# Patient Record
Sex: FEMALE | Race: OTHER | Employment: FULL TIME | URBAN - METROPOLITAN AREA
[De-identification: names, ages, dates, MRNs, and addresses within clinical notes are randomized per-mention and may not be internally consistent; named-entity substitution may affect disease eponyms.]

---

## 2017-01-10 ENCOUNTER — OFFICE VISIT (OUTPATIENT)
Dept: OBGYN CLINIC | Age: 32
End: 2017-01-10

## 2017-01-10 VITALS
WEIGHT: 175 LBS | SYSTOLIC BLOOD PRESSURE: 124 MMHG | HEART RATE: 90 BPM | DIASTOLIC BLOOD PRESSURE: 70 MMHG | BODY MASS INDEX: 31.01 KG/M2 | HEIGHT: 63 IN

## 2017-01-10 DIAGNOSIS — O36.80X0 PREGNANCY OF UNKNOWN ANATOMIC LOCATION: Primary | ICD-10-CM

## 2017-01-10 DIAGNOSIS — O21.9 NAUSEA/VOMITING IN PREGNANCY: ICD-10-CM

## 2017-01-10 RX ORDER — PYRIDOXINE HCL (VITAMIN B6) 25 MG
25 TABLET ORAL
Qty: 30 TAB | Refills: 2 | Status: SHIPPED | OUTPATIENT
Start: 2017-01-10 | End: 2017-07-20

## 2017-01-10 RX ORDER — ACETAMINOPHEN 325 MG/1
TABLET ORAL
COMMUNITY
End: 2017-07-20

## 2017-01-10 NOTE — PATIENT INSTRUCTIONS
Weeks 6 to 10 of Your Pregnancy: Care Instructions  Your Care Instructions    Congratulations on your pregnancy. This is an exciting and important time for you. You are 7w4d today and due 8/25/2017. During the first 6 to 10 weeks of your pregnancy, your body goes through many changes. Your baby grows very fast, even though you cannot feel it yet. You may start to notice that you feel different, both in your body and your emotions. Because each woman's pregnancy is unique, there is no right way to feel. You may feel the healthiest you have ever been, or you may feel tired or sick to your stomach (\"morning sickness\"). These early weeks are a time to make healthy choices and to eat the best foods for you and your baby. This care sheet will give you some ideas. This is also a good time to think about birth defects testing. These are tests done during pregnancy to look for possible problems with the baby. First trimester tests for birth defects can be done between 8 and 17 weeks of pregnancy, depending on the test. Talk with your doctor about what kinds of tests are available. Follow-up care is a key part of your treatment and safety. Be sure to make and go to all appointments, and call your doctor if you are having problems. It's also a good idea to know your test results and keep a list of the medicines you take. How can you care for yourself at home? Eat well  · Eat at least 3 meals and 2 healthy snacks every day. Eat fresh, whole foods, including:  ¨ 7 or more servings of bread, tortillas, cereal, rice, pasta, or oatmeal.  ¨ 3 or more servings of vegetables, especially leafy green vegetables. ¨ 2 or more servings of fruits. ¨ 3 or more servings of milk, yogurt, or cheese. ¨ 2 or more servings of meat, turkey, chicken, fish, eggs, or dried beans. · Drink plenty of fluids, especially water. Avoid sodas and other sweetened drinks.   · Choose foods that have important vitamins for your baby, such as calcium, iron, and folate. ¨ Dairy products, tofu, canned fish with bones, almonds, broccoli, dark leafy greens, corn tortillas, and fortified orange juice are good sources of calcium. ¨ Beef, poultry, liver, spinach, lentils, dried beans, fortified cereals, and dried fruits are rich in iron. ¨ Dark leafy greens, broccoli, asparagus, liver, fortified cereals, orange juice, peanuts, and almonds are good sources of folate. · Avoid foods that could harm your baby. ¨ Do not eat raw or undercooked meat, chicken, or fish (such as sushi or raw oysters). ¨ Do not eat raw eggs or foods that contain raw eggs, such as Caesar dressing. ¨ Do not eat soft cheeses and unpasteurized dairy foods, such as Brie, feta, or blue cheese. ¨ Do not eat fish that contains a lot of mercury, such as shark, swordfish, tilefish, or ade mackerel. Do not eat more than 6 ounces of tuna each week. ¨ Do not eat raw sprouts, especially alfalfa sprouts. ¨ Cut down on caffeine, such as coffee, tea, and cola. Protect yourself and your baby  · Do not touch matt litter or cat feces. They can cause an infection that could harm your baby. · High body temperature can be harmful to your baby. So if you want to use a sauna or hot tub, be sure to talk to your doctor about how to use it safely. [place] with morning sickness  · Sip small amounts of water, juices, or shakes. Try drinking between meals, not with meals. · Eat 5 or 6 small meals a day. Try dry toast or crackers when you first get up, and eat breakfast a little later. · Avoid spicy, greasy, and fatty foods. · When you feel sick, open your windows or go for a short walk to get fresh air. · Try nausea wristbands. These help some women. · Tell your doctor if you think your prenatal vitamins make you sick. Where can you learn more? Go to http://mo-carlota.info/.   Enter G112 in the search box to learn more about \"Weeks 6 to 10 of Your Pregnancy: Care Instructions. \"  Current as of: May 30, 2016  Content Version: 11.1  © 5566-7280 [employer]. Care instructions adapted under license by Rain (which disclaims liability or warranty for this information). If you have questions about a medical condition or this instruction, always ask your healthcare professional. [address] any warranty or liability for your use of this information. Managing Morning Sickness: Care Instructions  Your Care Instructions  For many women, the toughest part of early pregnancy is morning sickness. Morning sickness can range from mild nausea to severe nausea with bouts of vomiting. Symptoms may be worse in the morning, although they can strike at any time of the day or night. If you have nausea, vomiting, or both, look for safe measures that can bring you relief. You can take simple steps at home to manage morning sickness. These steps include changing what and when you eat and avoiding certain foods and smells. Some women find that acupuncture and acupressure wristbands also help. Follow-up care is a key part of your treatment and safety. Be sure to make and go to all appointments, and call your doctor if you are having problems. It's also a good idea to know your test results and keep a list of the medicines you take. How can you care for yourself at home? · Keep food in your stomach, but not too much at once. Your nausea may be worse if your stomach is empty. Eat five or six small meals a day instead of three large meals. · For morning nausea, eat a small snack, such as a couple of crackers or dry biscuits, before rising. Allow a few minutes for your stomach to settle before you get out of bed slowly. · Drink plenty of fluids, enough so that your urine is light yellow or clear like water.  If you have kidney, heart, or liver disease and have to limit fluids, talk with your doctor before you increase the amount of fluids you drink. Some women find that peppermint tea helps with nausea. · Eat more protein, such as chicken, fish, lean meat, beans, nuts, and seeds. · Eat carbohydrate foods, such as potatoes, whole-grain cereals, rice, and pasta. · Avoid smells and foods that make you feel nauseated. Spicy or high-fat foods, citrus juice, milk, coffee, and tea with caffeine often make nausea worse. · Do not drink alcohol. · Do not smoke. Try not to be around others who smoke. If you need help quitting, talk to your doctor about stop-smoking programs and medicines. These can increase your chances of quitting for good. · If you are taking iron supplements, ask your doctor if they are necessary. Iron can make nausea worse. · Get lots of rest. Stress and fatigue can make your morning sickness worse. · Ask your doctor about taking prescription medicine, or over-the-counter products such as vitamin B6, doxylamine, or caridad, to relieve your symptoms. Your doctor can tell you the doses that are safe for you. · Take your prenatal vitamins at night on a full stomach. When should you call for help? Call your doctor now or seek immediate medical care if:  · You are too sick to your stomach to drink any fluids. · You have symptoms of dehydration, such as:  ¨ Dry eyes and a dry mouth. ¨ Passing only a little dark urine. ¨ Feeling thirstier than usual.  · You have new symptoms such as diarrhea, fever, or belly pain. Watch closely for changes in your health, and be sure to contact your doctor if:  · You lose weight. · You have ongoing nausea and vomiting. Where can you learn more? Go to http://mo-carlota.info/. Enter U506 in the search box to learn more about \"Managing Morning Sickness: Care Instructions. \"  Current as of: June 8, 2016  Content Version: 11.1  © 3430-7462 [employer].  Care instructions adapted under license by [employer] (which disclaims liability or warranty for this information). If you have questions about a medical condition or this instruction, always ask your healthcare professional. [place] [address] any warranty or liability for your use of this information.

## 2017-01-10 NOTE — PROGRESS NOTES
28 y.o. with amenorrhea and positive UPT. Reports nausea and vomiting. Somewhat improved with caridad tea. Review of Systems:   General ROS: negative for fever  Endocrine ROS: negative for -  breast mass/nipple discharge/galactorrhea, hair pattern changes, hot flashes, skin changes or temperature intolerance. Breast ROS: negative for - breast tenderness  Gastrointestinal ROS: no abdominal pain, change in bowel habits, or black or bloody stools  Genito-Urinary ROS: no dysuria, trouble voiding, incontinence or hematuria. No pelvic pain, vaginal bleeding/unusual discharge. Musculoskeletal ROS: negative    Regular menses, no hx STI/abn pap. Pap smear due per patient. OB History      Para Term  AB TAB SAB Ectopic Multiple Living    3 2 2               History reviewed. No pertinent past medical history. History reviewed. No pertinent past surgical history. Social History     Social History    Marital status: SINGLE     Spouse name: N/A    Number of children: N/A    Years of education: N/A     Occupational History    Not on file. Social History Main Topics    Smoking status: Never Smoker    Smokeless tobacco: Never Used    Alcohol use Yes      Comment: none since pregnancy    Drug use: No    Sexual activity: Yes     Partners: Male     Other Topics Concern    Not on file     Social History Narrative    No narrative on file     No Known Allergies  No current outpatient prescriptions on file prior to visit. No current facility-administered medications on file prior to visit. Visit Vitals    /70    Pulse 90    Ht 5' 3\" (1.6 m)    Wt 175 lb (79.4 kg)    LMP 2016 (Exact Date)    BMI 31 kg/m2     Gen:   NAD, WN, WH. Abd:  ND, soft, NT.  US done, see report. A/P:    ICD-10-CM ICD-9-CM    1.  Pregnancy of unknown anatomic location Z33.1 V22.2 AMB POC US OB < 14 WKS, 1ST GESTATION      pyridoxine, vitamin B6, (VITAMIN B-6) 25 mg tablet      doxylamine succinate (UNISOM, DOXYLAMINE,) 25 mg tablet   2. Nausea/vomiting in pregnancy O21.9 643.90 pyridoxine, vitamin B6, (VITAMIN B-6) 25 mg tablet      doxylamine succinate (UNISOM, DOXYLAMINE,) 25 mg tablet     Briefly reviewed pregnancy care. N/v in pregnancy:  Encouraged adequate hydration, frequent small snacks, caridad. Call for antiemetics needed. Follow up 4 wks for OB intake. Plan of care discussed. Patient expressed understanding.

## 2017-01-10 NOTE — MR AVS SNAPSHOT
Visit Information Date & Time Provider Department Dept. Phone Encounter #  
 1/10/2017 10:00 AM Carolyn Forbes DO [facility] [phone] [id number] Follow-up Instructions Return in about 4 weeks (around 2/7/2017). Upcoming Health Maintenance Date Due  
 PAP AKA CERVICAL CYTOLOGY 7/12/2007 INFLUENZA AGE 9 TO ADULT 8/1/2016 Allergies as of 1/10/2017  Review Complete On: 1/10/2017 By: Carolyn Forbes DO No Known Allergies Current Immunizations  Never Reviewed No immunizations on file. Not reviewed this visit You Were Diagnosed With   
  
 Codes Comments Pregnancy of unknown anatomic location    -  Primary ICD-10-CM: Z33.1 ICD-9-CM: V22.2 Nausea/vomiting in pregnancy     ICD-10-CM: O21.9 ICD-9-CM: 643.90 Vitals BP Pulse Height(growth percentile) Weight(growth percentile) LMP BMI  
 124/70 90 5' 3\" (1.6 m) 175 lb (79.4 kg) 11/18/2016 (Exact Date) 31 kg/m2 OB Status Smoking Status Pregnant Never Smoker BMI and BSA Data Body Mass Index Body Surface Area  
 31 kg/m 2 1.88 m 2 Preferred Pharmacy Pharmacy Name Phone [id number] [phone] [phone] Your Updated Medication List  
  
   
This list is accurate as of: 1/10/17 11:05 AM.  Always use your most recent med list.  
  
  
  
  
 doxylamine succinate 25 mg tablet Commonly known as:  UNISOM (DOXYLAMINE) Take 1 Tab by mouth nightly as needed for Sleep. Indications: NAUSEA GRAVIDARUM  
  
 pnv w/o calcium-iron fum-fa 27-1 mg Tab Take  by mouth.  
  
 pyridoxine (vitamin B6) 25 mg tablet Commonly known as:  VITAMIN B-6 Take 1 Tab by mouth every eight (8) hours as needed. Indications: NAUSEA GRAVIDARUM  
  
 TYLENOL 325 mg tablet Generic drug:  acetaminophen Take  by mouth every four (4) hours as needed for Pain. Prescriptions Sent to Pharmacy Refills  
 pyridoxine, vitamin B6, (VITAMIN B-6) 25 mg tablet 2 Sig: Take 1 Tab by mouth every eight (8) hours as needed. Indications: NAUSEA GRAVIDARUM Class: Normal  
 Pharmacy: [address] [place] [address], [address] [place] [address]  #: [phone] Route: Oral  
 doxylamine succinate (UNISOM, DOXYLAMINE,) 25 mg tablet 2 Sig: Take 1 Tab by mouth nightly as needed for Sleep. Indications: NAUSEA GRAVIDARUM Class: Normal  
 Pharmacy: [address] [place] [address], [address] [place] [address]  #: [phone] Route: Oral  
  
We Performed the Following AMB POC US OB < 14 WKS, 1ST GESTATION [17366 CPT(R)] Follow-up Instructions Return in about 4 weeks (around 2/7/2017). Patient Instructions Weeks 6 to 10 of Your Pregnancy: Care Instructions Your Care Instructions Congratulations on your pregnancy. This is an exciting and important time for you. You are 7w4d today and due 8/25/2017. During the first 6 to 10 weeks of your pregnancy, your body goes through many changes. Your baby grows very fast, even though you cannot feel it yet. You may start to notice that you feel different, both in your body and your emotions. Because each woman's pregnancy is unique, there is no right way to feel. You may feel the healthiest you have ever been, or you may feel tired or sick to your stomach (\"morning sickness\"). These early weeks are a time to make healthy choices and to eat the best foods for you and your baby. This care sheet will give you some ideas. This is also a good time to think about birth defects testing. These are tests done during pregnancy to look for possible problems with the baby. First trimester tests for birth defects can be done between 8 and 17 weeks of pregnancy, depending on the test. Talk with your doctor about what kinds of tests are available. Follow-up care is a key part of your treatment and safety.  Be sure to make and go to all appointments, and call your doctor if you are having problems. It's also a good idea to know your test results and keep a list of the medicines you take. How can you care for yourself at home? Eat well · Eat at least 3 meals and 2 healthy snacks every day. Eat fresh, whole foods, including: ¨ 7 or more servings of bread, tortillas, cereal, rice, pasta, or oatmeal. 
¨ 3 or more servings of vegetables, especially leafy green vegetables. ¨ 2 or more servings of fruits. ¨ 3 or more servings of milk, yogurt, or cheese. ¨ 2 or more servings of meat, turkey, chicken, fish, eggs, or dried beans. · Drink plenty of fluids, especially water. Avoid sodas and other sweetened drinks. · Choose foods that have important vitamins for your baby, such as calcium, iron, and folate. ¨ Dairy products, tofu, canned fish with bones, almonds, broccoli, dark leafy greens, corn tortillas, and fortified orange juice are good sources of calcium. ¨ Beef, poultry, liver, spinach, lentils, dried beans, fortified cereals, and dried fruits are rich in iron. ¨ Dark leafy greens, broccoli, asparagus, liver, fortified cereals, orange juice, peanuts, and almonds are good sources of folate. · Avoid foods that could harm your baby. ¨ Do not eat raw or undercooked meat, chicken, or fish (such as sushi or raw oysters). ¨ Do not eat raw eggs or foods that contain raw eggs, such as Caesar dressing. ¨ Do not eat soft cheeses and unpasteurized dairy foods, such as Brie, feta, or blue cheese. ¨ Do not eat fish that contains a lot of mercury, such as shark, swordfish, tilefish, or ade mackerel. Do not eat more than 6 ounces of tuna each week. ¨ Do not eat raw sprouts, especially alfalfa sprouts. ¨ Cut down on caffeine, such as coffee, tea, and cola. Protect yourself and your baby · Do not touch matt litter or cat feces. They can cause an infection that could harm your baby. · High body temperature can be harmful to your baby. So if you want to use a sauna or hot tub, be sure to talk to your doctor about how to use it safely. [place] with morning sickness · Sip small amounts of water, juices, or shakes. Try drinking between meals, not with meals. · Eat 5 or 6 small meals a day. Try dry toast or crackers when you first get up, and eat breakfast a little later. · Avoid spicy, greasy, and fatty foods. · When you feel sick, open your windows or go for a short walk to get fresh air. · Try nausea wristbands. These help some women. · Tell your doctor if you think your prenatal vitamins make you sick. Where can you learn more? Go to http://mo-carlota.info/. Enter G112 in the search box to learn more about \"Weeks 6 to 10 of Your Pregnancy: Care Instructions. \" Current as of: May 30, 2016 Content Version: 11.1 © 7193-7644 [employer]. Care instructions adapted under license by Springpad (which disclaims liability or warranty for this information). If you have questions about a medical condition or this instruction, always ask your healthcare professional. [place] [address] any warranty or liability for your use of this information. Managing Morning Sickness: Care Instructions Your Care Instructions For many women, the toughest part of early pregnancy is morning sickness. Morning sickness can range from mild nausea to severe nausea with bouts of vomiting. Symptoms may be worse in the morning, although they can strike at any time of the day or night. If you have nausea, vomiting, or both, look for safe measures that can bring you relief. You can take simple steps at home to manage morning sickness. These steps include changing what and when you eat and avoiding certain foods and smells. Some women find that acupuncture and acupressure wristbands also help. Follow-up care is a key part of your treatment and safety. Be sure to make and go to all appointments, and call your doctor if you are having problems. It's also a good idea to know your test results and keep a list of the medicines you take. How can you care for yourself at home? · Keep food in your stomach, but not too much at once. Your nausea may be worse if your stomach is empty. Eat five or six small meals a day instead of three large meals. · For morning nausea, eat a small snack, such as a couple of crackers or dry biscuits, before rising. Allow a few minutes for your stomach to settle before you get out of bed slowly. · Drink plenty of fluids, enough so that your urine is light yellow or clear like water. If you have kidney, heart, or liver disease and have to limit fluids, talk with your doctor before you increase the amount of fluids you drink. Some women find that peppermint tea helps with nausea. · Eat more protein, such as chicken, fish, lean meat, beans, nuts, and seeds. · Eat carbohydrate foods, such as potatoes, whole-grain cereals, rice, and pasta. · Avoid smells and foods that make you feel nauseated. Spicy or high-fat foods, citrus juice, milk, coffee, and tea with caffeine often make nausea worse. · Do not drink alcohol. · Do not smoke. Try not to be around others who smoke. If you need help quitting, talk to your doctor about stop-smoking programs and medicines. These can increase your chances of quitting for good. · If you are taking iron supplements, ask your doctor if they are necessary. Iron can make nausea worse. · Get lots of rest. Stress and fatigue can make your morning sickness worse. · Ask your doctor about taking prescription medicine, or over-the-counter products such as vitamin B6, doxylamine, or caridad, to relieve your symptoms. Your doctor can tell you the doses that are safe for you. · Take your prenatal vitamins at night on a full stomach. When should you call for help? Call your doctor now or seek immediate medical care if: 
· You are too sick to your stomach to drink any fluids. · You have symptoms of dehydration, such as: ¨ Dry eyes and a dry mouth. ¨ Passing only a little dark urine. ¨ Feeling thirstier than usual. 
· You have new symptoms such as diarrhea, fever, or belly pain. Watch closely for changes in your health, and be sure to contact your doctor if: 
· You lose weight. · You have ongoing nausea and vomiting. Where can you learn more? Go to http://mo-carlota.info/. Enter B496 in the search box to learn more about \"Managing Morning Sickness: Care Instructions. \" Current as of: June 8, 2016 Content Version: 11.1 © 6400-3908 [employer]ks. Care instructions adapted under license by [employer]f (which disclaims liability or warranty for this information). If you have questions about a medical condition or this instruction, always ask your healthcare professional. [place] [address] any warranty or liability for your use of this information. Introducing \[facility] [place] [facility]\"" & HEALTH SERVICES! Stacie Ramírez introduces [employer] patient portal. Now you can access parts of your medical record, email your doctor's office, and request medication refills online. 1. In your internet browser, go to [email or web address] 2. Click on the First Time User? Click Here link in the Sign In box. You will see the New Member Sign Up page. 3. Enter your [employer] Access Code exactly as it appears below. You will not need to use this code after youve completed the sign-up process. If you do not sign up before the expiration date, you must request a new code. · [employer] Access Code: Natasha Magana Expires: 4/10/2017 11:05 AM 
 
4. Enter the last four digits of your Social Security Number (xxxx) and Date of Birth (mm/dd/yyyy) as indicated and click Submit.  You will be taken to the next sign-up page. 5. Create a [employer] ID. This will be your [employer] login ID and cannot be changed, so think of one that is secure and easy to remember. 6. Create a [employer] password. You can change your password at any time. 7. Enter your Password Reset Question and Answer. This can be used at a later time if you forget your password. 8. Enter your e-mail address. You will receive e-mail notification when new information is available in [address]. 9. Click Sign Up. You can now view and download portions of your medical record. 10. Click the Download Summary menu link to download a portable copy of your medical information. If you have questions, please visit the Frequently Asked Questions section of the [employer] website. Remember, [employer] is NOT to be used for urgent needs. For medical emergencies, dial 911. Now available from your iPhone and Android! Please provide this summary of care documentation to your next provider. If you have any questions after today's visit, please call [phone].

## 2017-02-07 ENCOUNTER — ROUTINE PRENATAL (OUTPATIENT)
Dept: OBGYN CLINIC | Age: 32
End: 2017-02-07

## 2017-02-07 ENCOUNTER — HOSPITAL ENCOUNTER (OUTPATIENT)
Dept: LAB | Age: 32
Discharge: HOME | End: 2017-02-07

## 2017-02-07 VITALS
HEIGHT: 63 IN | DIASTOLIC BLOOD PRESSURE: 76 MMHG | SYSTOLIC BLOOD PRESSURE: 122 MMHG | HEART RATE: 107 BPM | BODY MASS INDEX: 31.54 KG/M2 | WEIGHT: 178 LBS

## 2017-02-07 DIAGNOSIS — Z3A.11 11 WEEKS GESTATION OF PREGNANCY: Primary | ICD-10-CM

## 2017-02-07 DIAGNOSIS — O21.9 NAUSEA AND VOMITING IN PREGNANCY PRIOR TO 22 WEEKS GESTATION: Primary | ICD-10-CM

## 2017-02-07 DIAGNOSIS — Z34.81 ENCOUNTER FOR SUPERVISION OF OTHER NORMAL PREGNANCY IN FIRST TRIMESTER: ICD-10-CM

## 2017-02-07 DIAGNOSIS — Z01.419 WELL WOMAN EXAM WITH ROUTINE GYNECOLOGICAL EXAM: ICD-10-CM

## 2017-02-07 DIAGNOSIS — Z3A.11 11 WEEKS GESTATION OF PREGNANCY: ICD-10-CM

## 2017-02-07 DIAGNOSIS — K59.00 CONSTIPATION, UNSPECIFIED CONSTIPATION TYPE: ICD-10-CM

## 2017-02-07 LAB
ANTIBODY SCREEN, EXTERNAL: NEGATIVE
HBSAG, EXTERNAL: NEGATIVE
HCT, EXTERNAL: 39.5
HGB, EXTERNAL: 13
HIV, EXTERNAL: NON REACTIVE
N. GONORRHEA, EXTERNAL: NEGATIVE
PLATELET CNT,   EXTERNAL: 327
RPR, EXTERNAL: NORMAL
RUBELLA, EXTERNAL: NORMAL
TYPE, ABO & RH, EXTERNAL: NORMAL

## 2017-02-07 PROCEDURE — 99001 SPECIMEN HANDLING PT-LAB: CPT | Performed by: OBSTETRICS & GYNECOLOGY

## 2017-02-07 RX ORDER — DOCUSATE SODIUM 100 MG/1
100 CAPSULE, LIQUID FILLED ORAL 2 TIMES DAILY
Qty: 60 CAP | Refills: 2 | Status: SHIPPED | OUTPATIENT
Start: 2017-02-07 | End: 2017-05-08

## 2017-02-07 RX ORDER — PROMETHAZINE HYDROCHLORIDE 12.5 MG/1
12.5 TABLET ORAL
Qty: 30 TAB | Refills: 0 | Status: SHIPPED | OUTPATIENT
Start: 2017-02-07 | End: 2017-07-20

## 2017-02-07 NOTE — PATIENT INSTRUCTIONS
Nutrition During Pregnancy: Care Instructions  Your Care Instructions  Healthy eating when you are pregnant is important for you and your baby. It can help you feel well and have a successful pregnancy and delivery. During pregnancy your nutrition needs increase. Even if you have excellent eating habits, your doctor may recommend a multivitamin to make sure you get enough iron and folic acid. Many pregnant women wonder how much weight they should gain. In general, women who were at a healthy weight before they became pregnant should gain between 25 and 35 pounds. Women who were overweight before pregnancy are usually advised to gain 15 to 25 pounds. Women who were underweight before pregnancy are usually advised to gain 28 to 40 pounds. Your doctor will work with you to set a weight goal that is right for you. Gaining a healthy amount of weight helps you have a healthy baby. Follow-up care is a key part of your treatment and safety. Be sure to make and go to all appointments, and call your doctor if you are having problems. It's also a good idea to know your test results and keep a list of the medicines you take. How can you care for yourself at home? · Eat plenty of fruits and vegetables. Include a variety of orange, yellow, and leafy dark-green vegetables every day. · Choose whole-grain bread, cereal, and pasta. Good choices include whole wheat bread, whole wheat pasta, brown rice, and oatmeal.  · Get 4 or more servings of milk and milk products each day. Good choices include nonfat or low-fat milk, yogurt, and cheese. If you cannot eat milk products, you can get calcium from calcium-fortified products such as orange juice, soy milk, and tofu. Other non-milk sources of calcium include leafy green vegetables, such as broccoli, kale, mustard greens, turnip greens, bok gerda, and brussels sprouts. · If you eat meat, pick lower-fat types.  Good choices include lean cuts of meat and chicken or turkey without the skin. · Do not eat shark, swordfish, ade mackerel, or tilefish. They have high levels of mercury, which is dangerous to your baby. You can eat up to 12 ounces a week of fish or shellfish that have low mercury levels. Good choices include shrimp, wild salmon, pollock, and catfish. Do not eat more than 6 ounces of tuna each week. · Heat lunch meats (such as turkey, ham, or bologna) to 165°F before you eat them. This reduces your risk of getting sick from a kind of bacteria that can be found in lunch meats. · Do not eat unpasteurized soft cheeses, such as brie, feta, fresh mozzarella, and blue cheese. They have a bacteria that could harm your baby. · Limit caffeine. If you drink coffee or tea, have no more than 1 cup a day. Caffeine is also found in nate. · Do not drink any alcohol. No amount of alcohol has been found to be safe during pregnancy. · Do not diet or try to lose weight. For example, do not follow a low-carbohydrate diet. If you are overweight at the start of your pregnancy, your doctor will work with you to manage your weight gain. · Tell your doctor about all vitamins and supplements you take. When should you call for help? Watch closely for changes in your health, and be sure to contact your doctor if you have any problems. Where can you learn more? Go to http://mo-carlota.info/. Enter Y785 in the search box to learn more about \"Nutrition During Pregnancy: Care Instructions. \"  Current as of: May 30, 2016  Content Version: 11.1  © 3408-7494 Healthwise, Incorporated. Care instructions adapted under license by [employer]ault (which disclaims liability or warranty for this information). If you have questions about a medical condition or this instruction, always ask your healthcare professional. [address] any warranty or liability for your use of this information.

## 2017-02-07 NOTE — PROGRESS NOTES
PRENATAL INTAKE SUMMARY    Ms. Saul Mejia presents today for her first prenatal visit. She had a confirmatory ultrasound last visit. OB History      Para Term  AB TAB SAB Ectopic Multiple Living    3 2 2               I have reviewed the patient's medical, obstetrical, social, and family histories, medications, and available lab results. Subjective:   She complains of nausea with vomiting for 14 days, not relieved with Diclegis. Objective:   Initial Physical Exam (New OB)    GENERAL APPEARANCE: alert, well appearing, in no apparent distress  HEAD: normocephalic, atraumatic  MOUTH: mucous membranes moist, pharynx normal without lesions  THYROID: no thyromegaly or masses present  BREASTS: no masses noted, no significant tenderness, no palpable axillary nodes, no skin changes  LUNGS: clear to auscultation, no wheezes, rales or rhonchi, symmetric air entry  HEART: regular rate and rhythm, no murmurs  ABDOMEN: soft, nontender, nondistended, no abnormal masses, no epigastric pain and FHT present  EXTREMITIES: no redness or tenderness in the calves or thighs, no edema  SKIN: normal coloration and turgor, no rashes  LYMPH NODES: no adenopathy palpable  NEUROLOGIC: alert, oriented, normal speech, no focal findings or movement disorder noted    PELVIC EXAM: EXTERNAL GENITALIA: normal appearing vulva with no masses, tenderness or lesions  VAGINA: no abnormal discharge or lesions  CERVIX: no lesions or cervical motion tenderness  UTERUS: gravid and consistent with 12 weeks  ADNEXA: no masses palpable and nontender  OB EXAM PELVIMETRY: appears adequate    Assessment/Plan:   Normal pregnancy @ 11w4d. Noted continued nausea and vomiting. Rx for phenergan sent to the pharmacy. Noted constipation- Colace sent as well.     Routine Prenatal care    ICD-10-CM ICD-9-CM    1. 11 weeks gestation of pregnancy Z3A.11 V22.2 CULTURE, URINE      HEMOGLOBIN FRACTIONATION      RPR      RUBELLA AB, IGG      CBC WITH AUTOMATED DIFF      HEP B SURFACE AG      HIV 1/2 AG/AB, 4TH GENERATION,W RFLX CONFIRM      TYPE & SCREEN      MATERNAL INTEGRATED 1 (SERUM)   2.  Well woman exam with routine gynecological exam Z01.419 V72.31 PAP IG, CT-NG-TV, RFX APTIMA HPV ASCUS ([id number])

## 2017-02-09 LAB
# FETUSES US: 1
ABO GROUP BLD: NORMAL
AGE AT DELIVERY: 31.1 YEARS
BACTERIA UR CULT: NO GROWTH
BASOPHILS # BLD AUTO: 0 X10E3/UL (ref 0–0.2)
BASOPHILS NFR BLD AUTO: 0 %
BLD GP AB SCN SERPL QL: NEGATIVE
COMMENTS:, 017293: NORMAL
EOSINOPHIL # BLD AUTO: 0.5 X10E3/UL (ref 0–0.4)
EOSINOPHIL NFR BLD AUTO: 4 %
ERYTHROCYTE [DISTWIDTH] IN BLOOD BY AUTOMATED COUNT: 14.6 % (ref 12.3–15.4)
GA METHOD: NORMAL
GA: 11.4 WEEKS
HBV SURFACE AG SERPL QL IA: NEGATIVE
HCT VFR BLD AUTO: 39.5 % (ref 34–46.6)
HGB A MFR BLD: 97.8 % (ref 94–98)
HGB A2 MFR BLD COLUMN CHROM: 2.2 % (ref 0.7–3.1)
HGB BLD-MCNC: 13 G/DL (ref 11.1–15.9)
HGB C MFR BLD: 0 %
HGB F MFR BLD: 0 % (ref 0–2)
HGB FRACT BLD-IMP: NORMAL
HGB S BLD QL SOLY: NEGATIVE
HGB S MFR BLD: 0 %
HIV 1+2 AB+HIV1 P24 AG SERPL QL IA: NON REACTIVE
IMM GRANULOCYTES # BLD: 0.1 X10E3/UL (ref 0–0.1)
IMM GRANULOCYTES NFR BLD: 1 %
INTEGRATED SCN PATIENT-IMP: NORMAL
LYMPHOCYTES # BLD AUTO: 1.8 X10E3/UL (ref 0.7–3.1)
LYMPHOCYTES NFR BLD AUTO: 14 %
MCH RBC QN AUTO: 30.1 PG (ref 26.6–33)
MCHC RBC AUTO-ENTMCNC: 32.9 G/DL (ref 31.5–35.7)
MCV RBC AUTO: 91 FL (ref 79–97)
MONOCYTES # BLD AUTO: 0.6 X10E3/UL (ref 0.1–0.9)
MONOCYTES NFR BLD AUTO: 5 %
NEUTROPHILS # BLD AUTO: 10.3 X10E3/UL (ref 1.4–7)
NEUTROPHILS NFR BLD AUTO: 76 %
NOTE:, 017857: NORMAL
PAPP-A SERPL-MCNC: 526.8 NG/ML
PLATELET # BLD AUTO: 327 X10E3/UL (ref 150–379)
RBC # BLD AUTO: 4.32 X10E6/UL (ref 3.77–5.28)
RESULTS, 017864: NORMAL
RH BLD: POSITIVE
RPR SER QL: NON REACTIVE
RUBV IGG SERPL IA-ACNC: 4.52 INDEX
SUBMIT PART 2 SAMPLE USING, 017204: NORMAL
WBC # BLD AUTO: 13.3 X10E3/UL (ref 3.4–10.8)

## 2017-02-16 LAB
C TRACH RRNA CVX QL NAA+PROBE: NEGATIVE
CYTOLOGIST CVX/VAG CYTO: ABNORMAL
CYTOLOGY CVX/VAG DOC THIN PREP: ABNORMAL
DX ICD CODE: ABNORMAL
DX ICD CODE: ABNORMAL
HPV I/H RISK 4 DNA CVX QL PROBE+SIG AMP: NEGATIVE
LABCORP, 190119: ABNORMAL
Lab: ABNORMAL
N GONORRHOEA RRNA CVX QL NAA+PROBE: NEGATIVE
OTHER STN SPEC: ABNORMAL
PATH REPORT.FINAL DX SPEC: ABNORMAL
PATHOLOGIST CVX/VAG CYTO: ABNORMAL
RECOM F/U CVX/VAG CYTO: ABNORMAL
STAT OF ADQ CVX/VAG CYTO-IMP: ABNORMAL
T VAGINALIS RRNA SPEC QL NAA+PROBE: NEGATIVE

## 2017-02-24 ENCOUNTER — TELEPHONE (OUTPATIENT)
Dept: OBGYN CLINIC | Age: 32
End: 2017-02-24

## 2017-03-07 ENCOUNTER — ROUTINE PRENATAL (OUTPATIENT)
Dept: OBGYN CLINIC | Age: 32
End: 2017-03-07

## 2017-03-07 ENCOUNTER — HOSPITAL ENCOUNTER (OUTPATIENT)
Dept: LAB | Age: 32
Discharge: HOME | End: 2017-03-07

## 2017-03-07 VITALS — HEIGHT: 63 IN | WEIGHT: 181 LBS | BODY MASS INDEX: 32.07 KG/M2

## 2017-03-07 DIAGNOSIS — Z3A.15 15 WEEKS GESTATION OF PREGNANCY: ICD-10-CM

## 2017-03-07 DIAGNOSIS — Z34.82 PRENATAL CARE, SUBSEQUENT PREGNANCY, SECOND TRIMESTER: Primary | ICD-10-CM

## 2017-03-07 PROCEDURE — 99001 SPECIMEN HANDLING PT-LAB: CPT | Performed by: OBSTETRICS & GYNECOLOGY

## 2017-03-07 NOTE — PATIENT INSTRUCTIONS

## 2017-03-07 NOTE — PROGRESS NOTES
15w4d. Patient doing well. Denies contractions, vaginal bleeding, leak of fluid. Prenatal labs reviewed. SIS  2 today. Anatomy US ordered  RTO IN 4 WEEKS. I have verbalized the plan of care with patient. The patient was given a full opportunity to ask questions, indicated that her questions had been answered and expressed understanding.

## 2017-03-07 NOTE — MR AVS SNAPSHOT
Visit Information Date & Time Provider Department Dept. Phone Encounter #  
 3/7/2017 10:45 AM Petra Resendiz, Niraj Valentino [phone] [id number] Follow-up Instructions Return in about 4 weeks (around 4/4/2017) for OB visit AND ULTRASOUND visit. Upcoming Health Maintenance Date Due INFLUENZA AGE 9 TO ADULT 8/1/2016 PAP AKA CERVICAL CYTOLOGY 2/7/2020 Allergies as of 3/7/2017  Review Complete On: 2/7/2017 By: Parisa Gardner MD  
 No Known Allergies Current Immunizations  Never Reviewed No immunizations on file. Not reviewed this visit You Were Diagnosed With   
  
 Codes Comments Prenatal care, subsequent pregnancy, second trimester    -  Primary ICD-10-CM: Z34.82 
ICD-9-CM: V22.1 15 weeks gestation of pregnancy     ICD-10-CM: Z3A.15 
ICD-9-CM: V22.2 Vitals Height(growth percentile) Weight(growth percentile) LMP BMI OB Status Smoking Status 5' 3\" (1.6 m) 181 lb (82.1 kg) 11/18/2016 (Exact Date) 32.06 kg/m2 Pregnant Never Smoker BMI and BSA Data Body Mass Index Body Surface Area 32.06 kg/m 2 1.91 m 2 Preferred Pharmacy Pharmacy Name Phone [id number] [phone] Your Updated Medication List  
  
   
This list is accurate as of: 3/7/17 11:01 AM.  Always use your most recent med list.  
  
  
  
  
 docusate sodium 100 mg capsule Commonly known as:  Autumn Santanaer Take 1 Cap by mouth two (2) times a day for 90 days. pnv w/o calcium-iron fum-fa 27-1 mg Tab Take  by mouth.  
  
 promethazine 12.5 mg tablet Commonly known as:  PHENERGAN Take 1 Tab by mouth every six (6) hours as needed for Nausea. Indications: EXCESSIVE VOMITING IN PREGNANCY  
  
 pyridoxine (vitamin B6) 25 mg tablet Commonly known as:  VITAMIN B-6 Take 1 Tab by mouth every eight (8) hours as needed. Indications: NAUSEA GRAVIDARUM  
  
 TYLENOL 325 mg tablet Generic drug:  acetaminophen Take  by mouth every four (4) hours as needed for Pain. Follow-up Instructions Return in about 4 weeks (around 4/4/2017) for OB visit AND ULTRASOUND visit. To-Do List   
 03/07/2017 Lab:  MATERNAL INTEGRATED 2 (SERUM) 04/03/2017 Imaging: AMB POC US OB >= 14 WKS, 1ST GESTATION Patient Instructions Weeks 14 to 18 of Your Pregnancy: Care Instructions Your Care Instructions During this time, you may start to \"show,\" so that you look pregnant to people around you. You may also notice some changes in your skin, such as itchy spots on your palms or acne on your face. Your baby is now able to pass urine, and your baby's first stool (meconium) is starting to collect in his or her intestines. Hair is also beginning to grow on your baby's head. At your next visit, between weeks 18 and 20, your doctor may do an ultrasound test. The test allows your doctor to check for certain problems. Your doctor can also tell the sex of your baby. This is a good time to think about whether you want to know whether your baby is a boy or a girl. Talk to your doctor about getting a flu shot to help keep you healthy during your pregnancy. As your pregnancy moves along, it is common to worry or feel anxious. Your body is changing a lot. And you are thinking about giving birth, the health of your baby, and becoming a parent. You can learn to cope with any anxiety and stress you feel. Follow-up care is a key part of your treatment and safety. Be sure to make and go to all appointments, and call your doctor if you are having problems. It's also a good idea to know your test results and keep a list of the medicines you take. How can you care for yourself at home? Reduce stress · Ask for help with cooking and housekeeping. · Figure out who or what causes your stress. Avoid these people or situations as much as possible. · Relax every day. Taking 10- to 15-minute breaks can make a big difference. Take a walk, listen to music, or take a warm bath. · Learn relaxation techniques at prenatal or yoga class. Or buy a relaxation tape. · List your fears about having a baby and becoming a parent. Share the list with someone you trust. Decide which worries are really small, and try to let them go. Exercise · If you did not exercise much before pregnancy, start slowly. Walking is best. Marlise Lennie yourself, and do a little more every day. · Brisk walking, easy jogging, low-impact aerobics, water aerobics, and yoga are good choices. Some sports, such as scuba diving, horseback riding, downhill skiing, gymnastics, and water skiing, are not a good idea. · Try to do at least 2½ hours a week of moderate exercise, such as a fast walk. One way to do this is to be active 30 minutes a day, at least 5 days a week. It's fine to be active in blocks of 10 minutes or more throughout your day and week. · Wear loose clothing. And wear shoes and a bra that provide good support. · Warm up and cool down to start and finish your exercise. · If you want to use weights, be sure to use light weights. They reduce stress on your joints. Stay at the best weight for you · Experts recommend that you gain about 1 pound a month during the first 3 months of your pregnancy. · Experts recommend that you gain about 1 pound a week during your last 6 months of pregnancy, for a total weight gain of 25 to 35 pounds. · If you are underweight, you will need to gain more weight (about 28 to 40 pounds). · If you are overweight, you may not need to gain as much weight (about 15 to 25 pounds). · If you are gaining weight too fast, use common sense. Exercise every day, and limit sweets, fast foods, and fats. Choose lean meats, fruits, and vegetables. · If you are having twins or more, your doctor may refer you to a dietitian. Where can you learn more? Go to http://mo-carlota.info/. Enter R364 in the search box to learn more about \"Weeks 14 to 18 of Your Pregnancy: Care Instructions. \" Current as of: May 30, 2016 Content Version: 11.1 © 3122-7303 KARALIT, Incorporated. Care instructions adapted under license by xoompark (which disclaims liability or warranty for this information). If you have questions about a medical condition or this instruction, always ask your healthcare professional. [address] any warranty or liability for your use of this information. Introducing [facility]! [facility] introduces Red Seraphim patient portal. Now you can access parts of your medical record, email your doctor's office, and request medication refills online. 1. In your internet browser, go to [email or web address] 2. Click on the First Time User? Click Here link in the Sign In box. You will see the New Member Sign Up page. 3. Enter your Red Seraphim Access Code exactly as it appears below. You will not need to use this code after youve completed the sign-up process. If you do not sign up before the expiration date, you must request a new code. · Red Seraphim Access Code: Ethel Comes Expires: 4/10/2017 11:05 AM 
 
4. Enter the last four digits of your Social Security Number (xxxx) and Date of Birth (mm/dd/yyyy) as indicated and click Submit. You will be taken to the next sign-up page. 5. Create a AdsNativet ID. This will be your Red Seraphim login ID and cannot be changed, so think of one that is secure and easy to remember. 6. Create a Red Seraphim password. You can change your password at any time. 7. Enter your Password Reset Question and Answer. This can be used at a later time if you forget your password. 8. Enter your e-mail address. You will receive e-mail notification when new information is available in [address] 9. Click Sign Up. You can now view and download portions of your medical record. 10. Click the Download Summary menu link to download a portable copy of your medical information. If you have questions, please visit the Frequently Asked Questions section of the BooknGo website. Remember, BooknGo is NOT to be used for urgent needs. For medical emergencies, dial 911. Now available from your iPhone and Android! Please provide this summary of care documentation to your next provider. If you have any questions after today's visit, please call [phone].

## 2017-03-09 LAB
# FETUSES US: 1
AFP ADJ MOM SERPL: 1.22
AFP SERPL-MCNC: 30 NG/ML
AGE AT DELIVERY: 31.1 YEARS
COLLECT DATE: NORMAL
COLLECT DATE: NORMAL
COMMENTS:, 017318: NORMAL
FET TS 18 RISK FROM MAT AGE: NORMAL
FET TS 21 RISK FROM MAT AGE: NORMAL
FIRST TRIMESTER SAMPLE, 017273: NORMAL
GA METHOD: NORMAL
GA: 11.4 WEEKS
GA: 15.4 WEEKS
HCG ADJ MOM SERPL: 0.57
HCG SERPL-ACNC: 21.4 IU/ML
IDDM PATIENT QL: NORMAL
INHIBIN A ADJ MOM SERPL: 0.55
INHIBIN A SERPL-MCNC: 90 PG/ML
INTEGRATED SCN PATIENT-IMP: NORMAL
NEURAL TUBE DEFECT RISK FETUS: NORMAL %
NOTE:, 017858: NORMAL
PAPP-A MOM, 017302: 1.02
PAPP-A SERPL-MCNC: 526.8 NG/ML
RESULTS, 017870: NORMAL
SECOND TRIMESTER SAMPLE, 017277: NORMAL
TS 18 RISK FETUS: NORMAL
TS 21 RISK FETUS: NORMAL
U ESTRIOL ADJ MOM SERPL: 1.55
U ESTRIOL SERPL-MCNC: 1.11 NG/ML

## 2017-04-04 ENCOUNTER — CLINICAL SUPPORT (OUTPATIENT)
Dept: OBGYN CLINIC | Age: 32
End: 2017-04-04

## 2017-04-04 ENCOUNTER — ROUTINE PRENATAL (OUTPATIENT)
Dept: OBGYN CLINIC | Age: 32
End: 2017-04-04

## 2017-04-04 VITALS
WEIGHT: 187.6 LBS | HEIGHT: 63 IN | BODY MASS INDEX: 33.24 KG/M2 | SYSTOLIC BLOOD PRESSURE: 112 MMHG | DIASTOLIC BLOOD PRESSURE: 69 MMHG | HEART RATE: 93 BPM

## 2017-04-04 DIAGNOSIS — J45.21 MILD INTERMITTENT ASTHMA WITH ACUTE EXACERBATION: ICD-10-CM

## 2017-04-04 DIAGNOSIS — Z34.82 PRENATAL CARE, SUBSEQUENT PREGNANCY, SECOND TRIMESTER: ICD-10-CM

## 2017-04-04 DIAGNOSIS — R21 RASH AND NONSPECIFIC SKIN ERUPTION: Primary | ICD-10-CM

## 2017-04-04 RX ORDER — HYDROCORTISONE 1 %
1 CREAM (GRAM) TOPICAL 2 TIMES DAILY
Qty: 30 G | Refills: 0 | Status: SHIPPED | OUTPATIENT
Start: 2017-04-04 | End: 2017-07-20

## 2017-04-04 RX ORDER — ALBUTEROL SULFATE 90 UG/1
1 AEROSOL, METERED RESPIRATORY (INHALATION)
Qty: 1 INHALER | Refills: 1 | Status: SHIPPED | OUTPATIENT
Start: 2017-04-04 | End: 2017-07-20

## 2017-04-04 NOTE — PROGRESS NOTES
19w4d.  No Ob issues. Pt. Notes an itcy rash on her torso, legs and back. She denies any sick contacts, change in lotion or food. She has seasonal allergies, but no other allergies. Morph done today and normal.  Rx for Cortisone sent to pharmacy. Integrated screen normal.  RTO 4 weeks.

## 2017-05-02 ENCOUNTER — ROUTINE PRENATAL (OUTPATIENT)
Dept: OBGYN CLINIC | Age: 32
End: 2017-05-02

## 2017-05-02 VITALS
DIASTOLIC BLOOD PRESSURE: 64 MMHG | SYSTOLIC BLOOD PRESSURE: 120 MMHG | HEART RATE: 83 BPM | WEIGHT: 191 LBS | HEIGHT: 63 IN | BODY MASS INDEX: 33.84 KG/M2

## 2017-05-02 DIAGNOSIS — Z3A.23 PREGNANCY WITH 23 COMPLETED WEEKS GESTATION: ICD-10-CM

## 2017-05-02 DIAGNOSIS — Z34.81 ENCOUNTER FOR SUPERVISION OF OTHER NORMAL PREGNANCY IN FIRST TRIMESTER: ICD-10-CM

## 2017-05-02 PROBLEM — M54.32 SCIATICA OF LEFT SIDE: Status: ACTIVE | Noted: 2017-05-02

## 2017-05-02 NOTE — PATIENT INSTRUCTIONS
Weeks 22 to 26 of Your Pregnancy: Care Instructions  Your Care Instructions    As you enter your 7th month of pregnancy at week 26, your baby's lungs are growing stronger and getting ready to breathe. You may notice that your baby responds to the sound of your or your partner's voice. You may also notice that your baby does less turning and twisting and more squirming or jerking. Jerking often means that your baby has the hiccups. Hiccups are perfectly normal and are only temporary. You may want to think about attending a childbirth preparation class. This is also a good time to start thinking about whether you want to have pain medicine during labor. Most pregnant women are tested for gestational diabetes between weeks 25 and 28. Gestational diabetes occurs when your blood sugar level gets too high when you're pregnant. The test is important, because you can have gestational diabetes and not know it. But the condition can cause problems for your baby. Follow-up care is a key part of your treatment and safety. Be sure to make and go to all appointments, and call your doctor if you are having problems. It's also a good idea to know your test results and keep a list of the medicines you take. How can you care for yourself at home? Ease discomfort from your baby's kicking  · Change your position. Sometimes this will cause your baby to change position too. · Take a deep breath while you raise your arm over your head. Then breathe out while you drop your arm. Do Kegel exercises to prevent urine from leaking  · You can do Kegel exercises while you stand or sit. ¨ Squeeze the same muscles you would use to stop your urine. Your belly and thighs should not move. ¨ Hold the squeeze for 3 seconds, and then relax for 3 seconds. ¨ Start with 3 seconds. Then add 1 second each week until you are able to squeeze for 10 seconds. ¨ Repeat the exercise 10 to 15 times for each session.  Do three or more sessions each day.  Ease or reduce swelling in your feet, ankles, hands, and fingers  · If your fingers are puffy, take off your rings. · Do not eat high-salt foods, such as potato chips. · Prop up your feet on a stool or couch as much as possible. Sleep with pillows under your feet. · Do not stand for long periods of time or wear tight shoes. · Wear support stockings. Where can you learn more? Go to http://mo-carlota.info/. Enter G264 in the search box to learn more about \"Weeks 22 to 26 of Your Pregnancy: Care Instructions. \"  Current as of: May 30, 2016  Content Version: 11.2  © 1029-6484 Hy[employer]ia. Care instructions adapted under license by [employer] (which disclaims liability or warranty for this information). If you have questions about a medical condition or this instruction, always ask your healthcare professional. [place] [address] any warranty or liability for your use of this information. Sciatica: Exercises  Your Care Instructions  Here are some examples of typical rehabilitation exercises for your condition. Start each exercise slowly. Ease off the exercise if you start to have pain. Your doctor or physical therapist will tell you when you can start these exercises and which ones will work best for you. When you are not being active, find a comfortable position for rest. Some people are comfortable on the floor or a medium-firm bed with a small pillow under their head and another under their knees. Some people prefer to lie on their side with a pillow between their knees. Don't stay in one position for too long. Take short walks (10 to 20 minutes) every 2 to 3 hours. Avoid slopes, hills, and stairs until you feel better. Walk only distances you can manage without pain, especially leg pain. How to do the exercises  Back stretches    1. Get down on your hands and knees on the floor. 2. Relax your head and allow it to droop.  Round your back up toward the ceiling until you feel a nice stretch in your upper, middle, and lower back. Hold this stretch for as long as it feels comfortable, or about 15 to 30 seconds. 3. Return to the starting position with a flat back while you are on your hands and knees. 4. Let your back sway by pressing your stomach toward the floor. Lift your buttocks toward the ceiling. 5. Hold this position for 15 to 30 seconds. 6. Repeat 2 to 4 times. Follow-up care is a key part of your treatment and safety. Be sure to make and go to all appointments, and call your doctor if you are having problems. It's also a good idea to know your test results and keep a list of the medicines you take. Where can you learn more? Go to http://mo-carlota.info/. Enter K701 in the search box to learn more about \"Sciatica: Exercises. \"  Current as of: May 23, 2016  Content Version: 11.2  © 7673-3945 [employer]Korner, Incorporated. Care instructions adapted under license by [employer]oka (which disclaims liability or warranty for this information). If you have questions about a medical condition or this instruction, always ask your healthcare professional. [address] any warranty or liability for your use of this information.

## 2017-05-02 NOTE — PROGRESS NOTES
23w4d. No Ob issues. C/o sharp pain running down her left leg. Denies LOF/Vb or DFM. Encouraged exercise. Info given for sciatica exercises. Glucola at next visit. RTO 4 weeks.

## 2017-05-17 ENCOUNTER — HOSPITAL ENCOUNTER (EMERGENCY)
Age: 32
Discharge: HOME | End: 2017-05-17
Attending: EMERGENCY MEDICINE
Payer: COMMERCIAL

## 2017-05-17 VITALS
OXYGEN SATURATION: 100 % | TEMPERATURE: 98.7 F | DIASTOLIC BLOOD PRESSURE: 54 MMHG | HEART RATE: 90 BPM | RESPIRATION RATE: 18 BRPM | SYSTOLIC BLOOD PRESSURE: 110 MMHG

## 2017-05-17 DIAGNOSIS — M77.8 LEFT HAND TENDONITIS: Primary | ICD-10-CM

## 2017-05-17 PROCEDURE — 99283 EMERGENCY DEPT VISIT LOW MDM: CPT

## 2017-05-17 RX ORDER — METHYLPREDNISOLONE 4 MG/1
TABLET ORAL
Qty: 1 DOSE PACK | Refills: 0 | Status: SHIPPED | OUTPATIENT
Start: 2017-05-17 | End: 2017-07-20 | Stop reason: ALTCHOICE

## 2017-05-17 NOTE — ED NOTES
I have reviewed discharge instructions with the patient. The patient verbalized understanding. Patient armband removed and shredded. VSS.  Patient given ice pack to go home with and educated on \"RICE\"

## 2017-05-17 NOTE — DISCHARGE INSTRUCTIONS
Tendon Injury (Tendinopathy): Care Instructions  Your Care Instructions  Tendons are tough, flexible tissues that connect muscle to bone. A tendon can hurt or get torn from overuse or aging, especially tendons in the shoulder, elbow, wrist, hip, knee, or ankle. Tendon injuries may be called tendinopathy or tendinitis. Tendon injuries can occur from any motion you have to repeat in a job, sports, or daily activities. Tennis elbow is one common tendon injury. You can treat most tendon problems with over-the-counter pain medicine, rest, changes in your activities, and physical therapy. Follow-up care is a key part of your treatment and safety. Be sure to make and go to all appointments, and call your doctor if you are having problems. Its also a good idea to know your test results and keep a list of the medicines you take. How can you care for yourself at home? · Rest the sore area. You may have to stop doing the activity that caused the tendon pain for a while. · Take an over-the-counter pain medicine, such as acetaminophen (Tylenol), ibuprofen (Advil, Motrin), or naproxen (Aleve). Read and follow all instructions on the label. · Do not take two or more pain medicines at the same time unless the doctor told you to. Many pain medicines have acetaminophen, which is Tylenol. Too much acetaminophen (Tylenol) can be harmful. · Put ice or a cold pack on the sore area for 10 to 20 minutes at a time. Try to do this every 1 to 2 hours for the next 3 days (when you are awake) or until any swelling goes down. Put a thin cloth between the ice and your skin. · Prop up the sore area on a pillow when you ice it or anytime you sit or lie down during the next 3 days. Try to keep it above the level of your heart. This will help reduce swelling.   · Follow your doctor's advice for wearing and caring for a sling, splint, or cast. In some cases, you may wear one of these for a while to help your tendon heal.  · Follow your doctor's advice for stretching and physical therapy. Gently move your joint through its full range of motion. This will prevent stiffness in your joint. · Go back to your activity slowly. Warm up before and stretch after the activity. You also can try making some changes. For example, if a sport caused your tendon pain, alternate the sport with another activity. If using a tool causes pain, switch hands or change your . Stop the activity if it hurts. After the activity, apply ice to prevent pain and swelling. · Do not smoke. Smoking can slow healing. If you need help quitting, talk to your doctor about stop-smoking programs and medicines. These can increase your chances of quitting for good. When should you call for help? Watch closely for changes in your health, and be sure to contact your doctor if:  · Your pain gets worse. · You do not get better as expected. Where can you learn more? Go to http://mo-carlota.info/. Enter A157 in the search box to learn more about \"Tendon Injury (Tendinopathy): Care Instructions. \"  Current as of: May 23, 2016  Content Version: 11.2  © 2373-4178 [employer]. Care instructions adapted under license by [employer]Pass (which disclaims liability or warranty for this information). If you have questions about a medical condition or this instruction, always ask your healthcare professional. [address] any warranty or liability for your use of this information.

## 2017-05-17 NOTE — ED PROVIDER NOTES
HPI Comments: 3:54 PM Milka Lao is a 28 y.o. female with no pertinent medical hx who presents to ED complaining of left hand pain and swelling onset 2 days ago. Patient rates her pain 81/0. She took Tylenol earlier today. Patient does not recall any specific injury to her hand. She states she woke up with her hand swollen. Patient is 25 weeks pregnant. No other concerns or symptoms at this time. PCP: No primary care provider on file. The history is provided by the patient. Past Medical History:   Diagnosis Date    11 weeks gestation of pregnancy 2/7/2017    Mild intermittent asthma with acute exacerbation 4/4/2017    Nausea and vomiting in pregnancy prior to 22 weeks gestation 2/7/2017    Sciatica of left side 5/2/2017       History reviewed. No pertinent surgical history. History reviewed. No pertinent family history. Social History     Social History    Marital status: SINGLE     Spouse name: N/A    Number of children: N/A    Years of education: N/A     Occupational History    Not on file. Social History Main Topics    Smoking status: Never Smoker    Smokeless tobacco: Never Used    Alcohol use Yes      Comment: none since pregnancy    Drug use: No    Sexual activity: Yes     Partners: Male     Other Topics Concern    Not on file     Social History Narrative         ALLERGIES: Review of patient's allergies indicates no known allergies. Review of Systems   Constitutional: Negative for chills, diaphoresis, fatigue, fever and unexpected weight change. HENT: Negative for congestion, dental problem, ear discharge, ear pain, hearing loss, nosebleeds, postnasal drip, rhinorrhea, sinus pressure, sore throat, trouble swallowing and voice change. Eyes: Negative for photophobia, pain, discharge, redness and visual disturbance. Respiratory: Negative for cough, chest tightness, shortness of breath, wheezing and stridor.     Cardiovascular: Negative for chest pain, palpitations and leg swelling. Gastrointestinal: Negative for abdominal distention, abdominal pain, anal bleeding, blood in stool, constipation, diarrhea, nausea and vomiting. Genitourinary: Negative for difficulty urinating, dysuria, flank pain, frequency, genital sores, hematuria and urgency. Musculoskeletal: Positive for myalgias (left hand). Negative for arthralgias, back pain, neck pain and neck stiffness. Skin: Negative for color change, pallor, rash and wound. Allergic/Immunologic: Negative for immunocompromised state. Neurological: Negative for dizziness, tremors, seizures, syncope, weakness, light-headedness, numbness and headaches. Hematological: Negative for adenopathy. Does not bruise/bleed easily. Psychiatric/Behavioral: Negative for agitation, confusion, decreased concentration, hallucinations, sleep disturbance and suicidal ideas. The patient is not nervous/anxious. Vitals:    05/17/17 1554   BP: 110/54   Pulse: 90   Resp: 18   Temp: 98.7 °F (37.1 °C)   SpO2: 100%            Physical Exam   Constitutional: She is oriented to person, place, and time. She appears well-developed and well-nourished. She appears distressed. 28year old female in mild painful distress. HENT:   Head: Normocephalic and atraumatic. Right Ear: External ear normal.   Left Ear: External ear normal.   Nose: Nose normal.   Mouth/Throat: Oropharynx is clear and moist. No oropharyngeal exudate. Eyes: Conjunctivae and EOM are normal. Pupils are equal, round, and reactive to light. Right eye exhibits no discharge. Left eye exhibits no discharge. No scleral icterus. Neck: Normal range of motion. No JVD present. No tracheal deviation present. No thyromegaly present. Cardiovascular: Normal rate, regular rhythm, normal heart sounds and intact distal pulses. Exam reveals no gallop and no friction rub. No murmur heard. Pulmonary/Chest: Effort normal and breath sounds normal. No stridor.  No respiratory distress. She has no wheezes. She has no rales. She exhibits no tenderness. Abdominal: Soft. Bowel sounds are normal. She exhibits no distension and no mass. There is no tenderness. There is no rebound and no guarding. Musculoskeletal: Normal range of motion. She exhibits tenderness. She exhibits no edema. Mild tenderness to the left 4th distal metacarpal area. Lymphadenopathy:     She has no cervical adenopathy. Neurological: She is alert and oriented to person, place, and time. She has normal reflexes. No cranial nerve deficit. Skin: Skin is warm and dry. No rash noted. She is not diaphoretic. No erythema. No pallor. Psychiatric: She has a normal mood and affect. Her behavior is normal. Judgment normal.   Nursing note and vitals reviewed. MDM  Number of Diagnoses or Management Options  Left hand tendonitis:      Amount and/or Complexity of Data Reviewed  Review and summarize past medical records: yes    Risk of Complications, Morbidity, and/or Mortality  Presenting problems: low  Management options: low    Patient Progress  Patient progress: stable    ED Course       Procedures    Vitals:  Patient Vitals for the past 12 hrs:   Temp Pulse Resp BP SpO2   05/17/17 1554 98.7 °F (37.1 °C) 90 18 110/54 100 %   SpO2 reviewed and within normal limits. Orders:  Orders Placed This Encounter    methylPREDNISolone (MEDROL, BUBBA,) 4 mg tablet     Sig: Use as directed     Dispense:  1 Dose Pack     Refill:  0       Reevaluation, Progress notes, Consult notes, or additional Procedure notes:   4:22 PM I have reassessed the patient and discussed their results and diagnosis. Pt will be discharged in stable condition. Patient is to return to emergency department if any new or worsening condition. Patient understands and verbalizes agreement with plan. Patient instructed to follow up for her elevated blood pressure. Disposition:  Diagnosis:   1.  Left hand tendonitis        Disposition: Discharged    Follow-up Information     Follow up With Details Comments Sylvester Llanos MD Schedule an appointment as soon as possible for a visit in 1 day Return to the ED, If symptoms worsen [facility]  [address] [place] [address]  [phone]             Patient's Medications   Start Taking    METHYLPREDNISOLONE (MEDROL, BUBBA,) 4 MG TABLET    Use as directed   Continue Taking    ACETAMINOPHEN (TYLENOL) 325 MG TABLET    Take  by mouth every four (4) hours as needed for Pain. ALBUTEROL (PROVENTIL HFA, VENTOLIN HFA, PROAIR HFA) 90 MCG/ACTUATION INHALER    Take 1 Puff by inhalation every four (4) hours as needed for Wheezing. Indications: EXERCISE-INDUCED BRONCHOSPASM PREVENTION    HYDROCORTISONE (CORTISONE) 1 % TOPICAL CREAM    Apply 1 Tube to affected area two (2) times a day. use thin layer    PNV W/O CALCIUM-IRON FUM-FA 27-1 MG TAB    Take  by mouth. PROMETHAZINE (PHENERGAN) 12.5 MG TABLET    Take 1 Tab by mouth every six (6) hours as needed for Nausea. Indications: EXCESSIVE VOMITING IN PREGNANCY    PYRIDOXINE, VITAMIN B6, (VITAMIN B-6) 25 MG TABLET    Take 1 Tab by mouth every eight (8) hours as needed. Indications: NAUSEA GRAVIDARUM   These Medications have changed    No medications on file   Stop Taking    No medications on file         [id number] [facility] for and in the presence of Rogelio Junior DO (05/17/17)      Physician Attestation  I personally performed the services described in this documentation, reviewed and edited the documentation which was dictated to the scribe in my presence, and it accurately records my words and actions.     Rogelio Junior DO (05/17/17)      Signed by: Kirsten Delatorre, May 17, 2017 at 4:24 PM

## 2017-05-30 ENCOUNTER — ROUTINE PRENATAL (OUTPATIENT)
Dept: OBGYN CLINIC | Age: 32
End: 2017-05-30

## 2017-05-30 ENCOUNTER — HOSPITAL ENCOUNTER (OUTPATIENT)
Dept: LAB | Age: 32
Discharge: HOME | End: 2017-05-30

## 2017-05-30 VITALS
DIASTOLIC BLOOD PRESSURE: 60 MMHG | WEIGHT: 196 LBS | BODY MASS INDEX: 34.73 KG/M2 | HEART RATE: 85 BPM | SYSTOLIC BLOOD PRESSURE: 109 MMHG | HEIGHT: 63 IN

## 2017-05-30 DIAGNOSIS — Z3A.27 27 WEEKS GESTATION OF PREGNANCY: Primary | ICD-10-CM

## 2017-05-30 PROCEDURE — 99001 SPECIMEN HANDLING PT-LAB: CPT | Performed by: OBSTETRICS & GYNECOLOGY

## 2017-05-30 NOTE — MR AVS SNAPSHOT
Visit Information Date & Time Provider Department Dept. Phone Encounter #  
 5/30/2017 10:45 AM Dwayne Rocha MD [facility] [phone] Follow-up Instructions Return in about 3 weeks (around 6/20/2017). Upcoming Health Maintenance Date Due INFLUENZA AGE 9 TO ADULT 8/1/2017 PAP AKA CERVICAL CYTOLOGY 2/7/2020 Allergies as of 5/30/2017  Review Complete On: 5/17/2017 By: Argenis Valente RN No Known Allergies Current Immunizations  Never Reviewed No immunizations on file. Not reviewed this visit You Were Diagnosed With   
  
 Codes Comments 27 weeks gestation of pregnancy    -  Primary ICD-10-CM: Z3A.27 
ICD-9-CM: V22.2 Vitals BP Pulse Height(growth percentile) Weight(growth percentile) LMP BMI  
 109/60 85 5' 3\" (1.6 m) 196 lb (88.9 kg) 11/18/2016 (Exact Date) 34.72 kg/m2 OB Status Smoking Status Pregnant Never Smoker BMI and BSA Data Body Mass Index Body Surface Area 34.72 kg/m 2 1.99 m 2 Preferred Pharmacy Pharmacy Name Phone [id number] [id number] [phone] Your Updated Medication List  
  
   
This list is accurate as of: 5/30/17 11:29 AM.  Always use your most recent med list.  
  
  
  
  
 albuterol 90 mcg/actuation inhaler Commonly known as:  PROVENTIL HFA, VENTOLIN HFA, PROAIR HFA Take 1 Puff by inhalation every four (4) hours as needed for Wheezing. Indications: EXERCISE-INDUCED BRONCHOSPASM PREVENTION  
  
 hydrocortisone 1 % topical cream  
Commonly known as:  Cortisone Apply 1 Tube to affected area two (2) times a day. use thin layer  
  
 methylPREDNISolone 4 mg tablet Commonly known as:  MEDROL (BUBBA) Use as directed  
  
 pnv w/o calcium-iron fum-fa 27-1 mg Tab Take  by mouth.  
  
 promethazine 12.5 mg tablet Commonly known as:  PHENERGAN  
 Take 1 Tab by mouth every six (6) hours as needed for Nausea. Indications: EXCESSIVE VOMITING IN PREGNANCY  
  
 pyridoxine (vitamin B6) 25 mg tablet Commonly known as:  VITAMIN B-6 Take 1 Tab by mouth every eight (8) hours as needed. Indications: NAUSEA GRAVIDARUM  
  
 TYLENOL 325 mg tablet Generic drug:  acetaminophen Take  by mouth every four (4) hours as needed for Pain. Follow-up Instructions Return in about 3 weeks (around 2017). To-Do List   
 2017 Lab:  CBC WITH AUTOMATED DIFF   
  
 2017 Lab:  GLUCOSE, GESTATIONAL, 1 HR TOLERANCE Patient Instructions Weeks 26 to 30 of Your Pregnancy: Care Instructions Your Care Instructions You are now in your last trimester of pregnancy. Your baby is growing rapidly. And you'll probably feel your baby moving around more often. Your doctor may ask you to count your baby's kicks. Your back may ache as your body gets used to your baby's size and length. If you haven't already had the Tdap shot during this pregnancy, talk to your doctor about getting it. It will help protect your  against pertussis infection. During this time, it's important to take care of yourself and pay attention to what your body needs. If you feel sexual, explore ways to be close with your partner that match your comfort and desire. Use the tips provided in this care sheet to find ways to be sexual in your own way. Follow-up care is a key part of your treatment and safety. Be sure to make and go to all appointments, and call your doctor if you are having problems. It's also a good idea to know your test results and keep a list of the medicines you take. How can you care for yourself at home? Take it easy at work · Take frequent breaks. If possible, stop working when you are tired, and rest during your lunch hour. · Take bathroom breaks every 2 hours. · Change positions often. If you sit for long periods, stand up and walk around. · When you stand for a long time, keep one foot on a low stool with your knee bent. After standing a lot, sit with your feet up. · Avoid fumes, chemicals, and tobacco smoke. Be sexual in your own way · Having sex during pregnancy is okay, unless your doctor tells you not to. · You may be very interested in sex, or you may have no interest at all. · Your growing belly can make it hard to find a good position during intercourse. [place] and explore. · You may get cramps in your uterus when your partner touches your breasts. · A back rub may relieve the backache or cramps that sometimes follow orgasm. Learn about  labor · Watch for signs of  labor. You may be going into labor if: 
¨ You have menstrual-like cramps, with or without nausea. ¨ You have about 4 or more contractions in 20 minutes, or about 8 or more within 1 hour, even after you have had a glass of water and are resting. ¨ You have a low, dull backache that does not go away when you change your position. ¨ You have pain or pressure in your pelvis that comes and goes in a pattern. ¨ You have intestinal cramping or flu-like symptoms, with or without diarrhea. ¨ You notice an increase or change in your vaginal discharge. Discharge may be heavy, mucus-like, watery, or streaked with blood. ¨ Your water breaks. · If you think you have  labor: ¨ Drink 2 or 3 glasses of water or juice. Not drinking enough fluids can cause contractions. ¨ Stop what you are doing, and empty your bladder. Then lie down on your left side for at least 1 hour. ¨ While lying on your side, find your breast bone. Put your fingers in the soft spot just below it. Move your fingers down toward your belly button to find the top of your uterus. Check to see if it is tight. ¨ Contractions can be weak or strong.  Record your contractions for an hour. Time a contraction from the start of one contraction to the start of the next one. ¨ Single or several strong contractions without a pattern are called Giorgi-Reyes contractions. They are practice contractions but not the start of labor. They often stop if you change what you are doing. ¨ Call your doctor if you have regular contractions. Where can you learn more? Go to http://mo-carlota.info/. Enter V448 in the search box to learn more about \"Weeks 26 to 30 of Your Pregnancy: Care Instructions. \" Current as of: May 30, 2016 Content Version: 11.2 © 0633-7853 [employer]. Care instructions adapted under license by [employer] (which disclaims liability or warranty for this information). If you have questions about a medical condition or this instruction, always ask your healthcare professional. [address] any warranty or liability for your use of this information. Introducing [facility]! [place] [employer] introduces Zyante patient portal. Now you can access parts of your medical record, email your doctor's office, and request medication refills online. 1. In your internet browser, go to [email or web address]. [employer] Systems 2. Click on the First Time User? Click Here link in the Sign In box. You will see the New Member Sign Up page. 3. Enter your Zyante Access Code exactly as it appears below. You will not need to use this code after youve completed the sign-up process. If you do not sign up before the expiration date, you must request a new code. · Zyante Access Code: [email or web address] Expires: 8/15/2017  4:29 PM 
 
4. Enter the last four digits of your Social Security Number (xxxx) and Date of Birth (mm/dd/yyyy) as indicated and click Submit. You will be taken to the next sign-up page. 5. Create a Zyante ID.  This will be your Zyante login ID and cannot be changed, so think of one that is secure and easy to remember. 6. Create a [employer] password. You can change your password at any time. 7. Enter your Password Reset Question and Answer. This can be used at a later time if you forget your password. 8. Enter your e-mail address. You will receive e-mail notification when new information is available in [address] 9. Click Sign Up. You can now view and download portions of your medical record. 10. Click the Download Summary menu link to download a portable copy of your medical information. If you have questions, please visit the Frequently Asked Questions section of the [employer] website. Remember, [employer] is NOT to be used for urgent needs. For medical emergencies, dial 911. Now available from your iPhone and Android! Please provide this summary of care documentation to your next provider. Your primary care clinician is listed as Rico Mann. If you have any questions after today's visit, please call [phone].

## 2017-05-30 NOTE — PATIENT INSTRUCTIONS
Weeks 26 to 30 of Your Pregnancy: Care Instructions  Your Care Instructions    You are now in your last trimester of pregnancy. Your baby is growing rapidly. And you'll probably feel your baby moving around more often. Your doctor may ask you to count your baby's kicks. Your back may ache as your body gets used to your baby's size and length. If you haven't already had the Tdap shot during this pregnancy, talk to your doctor about getting it. It will help protect your  against pertussis infection. During this time, it's important to take care of yourself and pay attention to what your body needs. If you feel sexual, explore ways to be close with your partner that match your comfort and desire. Use the tips provided in this care sheet to find ways to be sexual in your own way. Follow-up care is a key part of your treatment and safety. Be sure to make and go to all appointments, and call your doctor if you are having problems. It's also a good idea to know your test results and keep a list of the medicines you take. How can you care for yourself at home? Take it easy at work  · Take frequent breaks. If possible, stop working when you are tired, and rest during your lunch hour. · Take bathroom breaks every 2 hours. · Change positions often. If you sit for long periods, stand up and walk around. · When you stand for a long time, keep one foot on a low stool with your knee bent. After standing a lot, sit with your feet up. · Avoid fumes, chemicals, and tobacco smoke. Be sexual in your own way  · Having sex during pregnancy is okay, unless your doctor tells you not to. · You may be very interested in sex, or you may have no interest at all. · Your growing belly can make it hard to find a good position during intercourse. [place] and explore. · You may get cramps in your uterus when your partner touches your breasts.   · A back rub may relieve the backache or cramps that sometimes follow orgasm. Learn about  labor  · Watch for signs of  labor. You may be going into labor if:  ¨ You have menstrual-like cramps, with or without nausea. ¨ You have about 4 or more contractions in 20 minutes, or about 8 or more within 1 hour, even after you have had a glass of water and are resting. ¨ You have a low, dull backache that does not go away when you change your position. ¨ You have pain or pressure in your pelvis that comes and goes in a pattern. ¨ You have intestinal cramping or flu-like symptoms, with or without diarrhea. ¨ You notice an increase or change in your vaginal discharge. Discharge may be heavy, mucus-like, watery, or streaked with blood. ¨ Your water breaks. · If you think you have  labor:  ¨ Drink 2 or 3 glasses of water or juice. Not drinking enough fluids can cause contractions. ¨ Stop what you are doing, and empty your bladder. Then lie down on your left side for at least 1 hour. ¨ While lying on your side, find your breast bone. Put your fingers in the soft spot just below it. Move your fingers down toward your belly button to find the top of your uterus. Check to see if it is tight. ¨ Contractions can be weak or strong. Record your contractions for an hour. Time a contraction from the start of one contraction to the start of the next one. ¨ Single or several strong contractions without a pattern are called Giorgi-Reyes contractions. They are practice contractions but not the start of labor. They often stop if you change what you are doing. ¨ Call your doctor if you have regular contractions. Where can you learn more? Go to http://mo-carlota.info/. Enter G471 in the search box to learn more about \"Weeks 26 to 30 of Your Pregnancy: Care Instructions. \"  Current as of: May 30, 2016  Content Version: 11.2  © 6641-5587 [employer].  Care instructions adapted under license by [employer] (which disclaims liability or warranty for this information). If you have questions about a medical condition or this instruction, always ask your healthcare professional. [place] [address] any warranty or liability for your use of this information.

## 2017-05-30 NOTE — PROGRESS NOTES
27w4d.  No OB issues. Denies LOF/Vb. Noted increased movement. Notes decreased appetite. Gaining weight appropriately. Glucola and CBC today. RTO 3 weeks.

## 2017-05-31 LAB
BASOPHILS # BLD AUTO: 0 X10E3/UL (ref 0–0.2)
BASOPHILS NFR BLD AUTO: 0 %
EOSINOPHIL # BLD AUTO: 0.4 X10E3/UL (ref 0–0.4)
EOSINOPHIL NFR BLD AUTO: 4 %
ERYTHROCYTE [DISTWIDTH] IN BLOOD BY AUTOMATED COUNT: 15 % (ref 12.3–15.4)
GLUCOSE 1H P 50 G GLC PO SERPL-MCNC: 93 MG/DL (ref 65–139)
HCT VFR BLD AUTO: 34.6 % (ref 34–46.6)
HGB BLD-MCNC: 11.6 G/DL (ref 11.1–15.9)
IMM GRANULOCYTES # BLD: 0 X10E3/UL (ref 0–0.1)
IMM GRANULOCYTES NFR BLD: 0 %
LYMPHOCYTES # BLD AUTO: 1.8 X10E3/UL (ref 0.7–3.1)
LYMPHOCYTES NFR BLD AUTO: 19 %
MCH RBC QN AUTO: 29.5 PG (ref 26.6–33)
MCHC RBC AUTO-ENTMCNC: 33.5 G/DL (ref 31.5–35.7)
MCV RBC AUTO: 88 FL (ref 79–97)
MONOCYTES # BLD AUTO: 0.5 X10E3/UL (ref 0.1–0.9)
MONOCYTES NFR BLD AUTO: 5 %
NEUTROPHILS # BLD AUTO: 6.8 X10E3/UL (ref 1.4–7)
NEUTROPHILS NFR BLD AUTO: 72 %
PLATELET # BLD AUTO: 277 X10E3/UL (ref 150–379)
RBC # BLD AUTO: 3.93 X10E6/UL (ref 3.77–5.28)
WBC # BLD AUTO: 9.6 X10E3/UL (ref 3.4–10.8)

## 2017-06-20 ENCOUNTER — ROUTINE PRENATAL (OUTPATIENT)
Dept: OBGYN CLINIC | Age: 32
End: 2017-06-20

## 2017-06-20 VITALS
WEIGHT: 197 LBS | DIASTOLIC BLOOD PRESSURE: 75 MMHG | SYSTOLIC BLOOD PRESSURE: 107 MMHG | HEART RATE: 91 BPM | BODY MASS INDEX: 34.91 KG/M2 | HEIGHT: 63 IN

## 2017-06-20 DIAGNOSIS — Z3A.30 30 WEEKS GESTATION OF PREGNANCY: Primary | ICD-10-CM

## 2017-06-20 DIAGNOSIS — Z23 ENCOUNTER FOR IMMUNIZATION: ICD-10-CM

## 2017-06-20 NOTE — PROGRESS NOTES
1. TDAP/Boostrix 0.5ml, IM, right deltoid without difficulty. Pt tolerated injection well & voices no complaints.  Rcvd VIS dated 02-   [address] [facility], [address] [place] [address], [id number], Exp 07-

## 2017-06-20 NOTE — PROGRESS NOTES
30w4d. No OB issues. Denies LOF/DFM. Noted small spotting. 3rd trimester info given. Tdap Today. RTO 2 weeks on rotation.

## 2017-06-20 NOTE — MR AVS SNAPSHOT
Visit Information Date & Time Provider Department Dept. Phone Encounter #  
 6/20/2017 10:30 AM Saskia Roman MD [phone] [id number] Follow-up Instructions Return in about 2 weeks (around 7/4/2017). Upcoming Health Maintenance Date Due INFLUENZA AGE 9 TO ADULT 8/1/2017 PAP AKA CERVICAL CYTOLOGY 2/7/2020 Allergies as of 6/20/2017  Review Complete On: 5/17/2017 By: Jama Wilson RN No Known Allergies Current Immunizations  Never Reviewed Name Date Tdap  Incomplete Not reviewed this visit You Were Diagnosed With   
  
 Codes Comments 30 weeks gestation of pregnancy    -  Primary ICD-10-CM: Z3A.30 ICD-9-CM: V22.2 Encounter for immunization     ICD-10-CM: I46 ICD-9-CM: V03.89 Vitals BP Pulse Height(growth percentile) Weight(growth percentile) LMP BMI  
 107/75 91 5' 3\" (1.6 m) 197 lb (89.4 kg) 11/18/2016 (Exact Date) 34.9 kg/m2 OB Status Smoking Status Pregnant Never Smoker BMI and BSA Data Body Mass Index Body Surface Area 34.9 kg/m 2 1.99 m 2 Preferred Pharmacy Pharmacy Name Phone [id number] [address] [phone] Your Updated Medication List  
  
   
This list is accurate as of: 6/20/17 11:00 AM.  Always use your most recent med list.  
  
  
  
  
 albuterol 90 mcg/actuation inhaler Commonly known as:  PROVENTIL HFA, VENTOLIN HFA, PROAIR HFA Take 1 Puff by inhalation every four (4) hours as needed for Wheezing. Indications: EXERCISE-INDUCED BRONCHOSPASM PREVENTION  
  
 hydrocortisone 1 % topical cream  
Commonly known as:  Cortisone Apply 1 Tube to affected area two (2) times a day. use thin layer  
  
 methylPREDNISolone 4 mg tablet Commonly known as:  MEDROL (BUBBA) Use as directed  
  
 pnv w/o calcium-iron fum-fa 27-1 mg Tab Take  by mouth.  
  
 promethazine 12.5 mg tablet Commonly known as:  PHENERGAN Take 1 Tab by mouth every six (6) hours as needed for Nausea. Indications: EXCESSIVE VOMITING IN PREGNANCY  
  
 pyridoxine (vitamin B6) 25 mg tablet Commonly known as:  VITAMIN B-6 Take 1 Tab by mouth every eight (8) hours as needed. Indications: NAUSEA GRAVIDARUM  
  
 TYLENOL 325 mg tablet Generic drug:  acetaminophen Take  by mouth every four (4) hours as needed for Pain. We Performed the Following TETANUS, DIPHTHERIA TOXOIDS AND ACELLULAR PERTUSSIS VACCINE (TDAP), IN INDIVIDS. >=7, IM [id number] CPT(R)] Follow-up Instructions Return in about 2 weeks (around 7/4/2017). Introducing [facility]! Dania Camacho introduces [employer] patient portal. Now you can access parts of your medical record, email your doctor's office, and request medication refills online. 1. In your internet browser, go to [email or web address] 2. Click on the First Time User? Click Here link in the Sign In box. You will see the New Member Sign Up page. 3. Enter your [employer] Access Code exactly as it appears below. You will not need to use this code after youve completed the sign-up process. If you do not sign up before the expiration date, you must request a new code. · [employer] Access Code: [email or web address] Expires: 8/15/2017  4:29 PM 
 
4. Enter the last four digits of your Social Security Number (xxxx) and Date of Birth (mm/dd/yyyy) as indicated and click Submit. You will be taken to the next sign-up page. 5. Create a M:Metricst ID. This will be your [employer] login ID and cannot be changed, so think of one that is secure and easy to remember. 6. Create a [employer] password. You can change your password at any time. 7. Enter your Password Reset Question and Answer. This can be used at a later time if you forget your password. 8. Enter your e-mail address. You will receive e-mail notification when new information is available in [address] 9. Click Sign Up. You can now view and download portions of your medical record. 10. Click the Download Summary menu link to download a portable copy of your medical information. If you have questions, please visit the Frequently Asked Questions section of the [email or web address] website. Remember, [email or web address] is NOT to be used for urgent needs. For medical emergencies, dial 911. Now available from your iPhone and Android! Please provide this summary of care documentation to your next provider. Your primary care clinician is listed as Julissa Gavin. If you have any questions after today's visit, please call [phone].

## 2017-07-06 ENCOUNTER — ROUTINE PRENATAL (OUTPATIENT)
Dept: OBGYN CLINIC | Age: 32
End: 2017-07-06

## 2017-07-06 VITALS
RESPIRATION RATE: 18 BRPM | HEIGHT: 63 IN | SYSTOLIC BLOOD PRESSURE: 108 MMHG | BODY MASS INDEX: 35.08 KG/M2 | HEART RATE: 91 BPM | DIASTOLIC BLOOD PRESSURE: 69 MMHG | WEIGHT: 198 LBS

## 2017-07-06 DIAGNOSIS — Z3A.32 32 WEEKS GESTATION OF PREGNANCY: ICD-10-CM

## 2017-07-06 DIAGNOSIS — Z34.83 PRENATAL CARE, SUBSEQUENT PREGNANCY, THIRD TRIMESTER: Primary | ICD-10-CM

## 2017-07-06 NOTE — PROGRESS NOTES
Patient without complaints, good fetal movement. Follow up 2 weeks on rotation. Plan of care discussed. Patient expressed understanding.

## 2017-07-20 ENCOUNTER — ROUTINE PRENATAL (OUTPATIENT)
Dept: OBGYN CLINIC | Age: 32
End: 2017-07-20

## 2017-07-20 VITALS
BODY MASS INDEX: 35.79 KG/M2 | HEIGHT: 63 IN | DIASTOLIC BLOOD PRESSURE: 77 MMHG | WEIGHT: 202 LBS | SYSTOLIC BLOOD PRESSURE: 124 MMHG | HEART RATE: 106 BPM

## 2017-07-20 DIAGNOSIS — Z3A.34 34 WEEKS GESTATION OF PREGNANCY: ICD-10-CM

## 2017-07-20 DIAGNOSIS — Z34.83 PRENATAL CARE, SUBSEQUENT PREGNANCY, THIRD TRIMESTER: Primary | ICD-10-CM

## 2017-07-20 PROBLEM — Z3A.11 11 WEEKS GESTATION OF PREGNANCY: Status: RESOLVED | Noted: 2017-02-07 | Resolved: 2017-07-20

## 2017-07-20 NOTE — PATIENT INSTRUCTIONS

## 2017-07-20 NOTE — MR AVS SNAPSHOT
Visit Information Date & Time Provider Department Dept. Phone Encounter #  
 7/20/2017 10:30 AM Jeana Corrales, [facility] [phone] Follow-up Instructions Return in about 2 weeks (around 8/3/2017). Follow-up and Disposition History Upcoming Health Maintenance Date Due INFLUENZA AGE 9 TO ADULT 8/1/2017 PAP AKA CERVICAL CYTOLOGY 2/7/2020 Allergies as of 7/20/2017  Review Complete On: 7/20/2017 By: Jeana Corrales DO No Known Allergies Current Immunizations  Reviewed on 6/20/2017 Name Date Tdap 6/20/2017 11:05 AM  
  
 Not reviewed this visit You Were Diagnosed With   
  
 Codes Comments Prenatal care, subsequent pregnancy, third trimester    -  Primary ICD-10-CM: Z34.83 ICD-9-CM: V22.1 34 weeks gestation of pregnancy     ICD-10-CM: Z3A.34 
ICD-9-CM: V22.2 Vitals BP Pulse Height(growth percentile) Weight(growth percentile) LMP BMI  
 124/77 (!) 106 5' 3\" (1.6 m) 202 lb (91.6 kg) 11/18/2016 (Exact Date) 35.78 kg/m2 OB Status Smoking Status Pregnant Never Smoker Vitals History BMI and BSA Data Body Mass Index Body Surface Area 35.78 kg/m 2 2.02 m 2 Preferred Pharmacy Pharmacy Name Phone [phone] [phone] Your Updated Medication List  
  
   
This list is accurate as of: 7/20/17 10:46 AM.  Always use your most recent med list.  
  
  
  
  
 pnv w/o calcium-iron fum-fa 27-1 mg Tab Take  by mouth. Follow-up Instructions Return in about 2 weeks (around 8/3/2017). To-Do List   
 07/21/2017 Microbiology:  CULTURE, GENITAL GROUP B STREP Patient Instructions Weeks 34 to 36 of Your Pregnancy: Care Instructions Your Care Instructions By now, your baby and your belly have grown quite large.  It is almost time to give birth. A full-term pregnancy can deliver between 37 and 42 weeks. Your baby's lungs are almost ready to breathe air. The bones in your baby's head are now firm enough to protect it, but soft enough to move down through the birth canal. 
You may feel excited, happy, anxious, or scared. You may wonder how you will know if you are in labor or what to expect during labor. Try to be flexible in your expectations of the birth. Because each birth is different, there is no way to know exactly what childbirth will be like for you. This care sheet will help you know what to expect and how to prepare. This may make your childbirth easier. If you haven't already had the Tdap shot during this pregnancy, talk to your doctor about getting it. It will help protect your  against pertussis infection. In the 36th week, most women have a test for group B streptococcus (GBS). GBS is a common bacteria that can live in the vagina and rectum. It can make your baby sick after birth. If you test positive, you will get antibiotics during labor. The medicine will keep your baby from getting the bacteria. Follow-up care is a key part of your treatment and safety. Be sure to make and go to all appointments, and call your doctor if you are having problems. It's also a good idea to know your test results and keep a list of the medicines you take. How can you care for yourself at home? Learn about pain relief choices · Pain is different for every woman. Talk with your doctor about your feelings about pain. · You can choose from several types of pain relief. These include medicine or breathing techniques, as well as comfort measures. You can use more than one option. · If you choose to have pain medicine during labor, talk to your doctor about your options. Some medicines lower anxiety and help with some of the pain. Others make your lower body numb so that you won't feel pain. · Be sure to tell your doctor about your pain medicine choice before you start labor or very early in your labor. You may be able to change your mind as labor progresses. · Rarely, a woman is put to sleep by medicine given through a mask or an IV. Labor and delivery · The first stage of labor has three parts: early, active, and transition. ¨ Most women have early labor at home. You can stay busy or rest, eat light snacks, drink clear fluids, and start counting contractions. ¨ When talking during a contraction gets hard, you may be moving to active labor. During active labor, you should head for the hospital if you are not there already. ¨ You are in active labor when contractions come every 3 to 4 minutes and last about 60 seconds. Your cervix is opening more rapidly. ¨ If your water breaks, contractions will come faster and stronger. ¨ During transition, your cervix is stretching, and contractions are coming more rapidly. ¨ You may want to push, but your cervix might not be ready. Your doctor will tell you when to push. · The second stage starts when your cervix is completely opened and you are ready to push. ¨ Contractions are very strong to push the baby down the birth canal. 
¨ You will feel the urge to push. You may feel like you need to have a bowel movement. ¨ You may be coached to push with contractions. These contractions will be very strong, but you will not have them as often. You can get a little rest between contractions. ¨ You may be emotional and irritable. You may not be aware of what is going on around you. ¨ One last push, and your baby is born. · The third stage is when a few more contractions push out the placenta. This may take 30 minutes or less. · The fourth stage is the welcome recovery. You may feel overwhelmed with emotions and exhausted but alert. This is a good time to start breastfeeding. Where can you learn more? Go to http://mo-carlota.info/. Enter N277 in the search box to learn more about \"Weeks 34 to 36 of Your Pregnancy: Care Instructions. \" Current as of: March 16, 2017 Content Version: 11.3 © 0609-2128 [employer]. Care instructions adapted under license by [employer] (which disclaims liability or warranty for this information). If you have questions about a medical condition or this instruction, always ask your healthcare professional. [place] [address] any warranty or liability for your use of this information. Introducing [facility]! [employer] introduces [employer] patient portal. Now you can access parts of your medical record, email your doctor's office, and request medication refills online. 1. In your internet browser, go to [email or web address]://"[email or web address] ([employer]". [employer]/"[employer]" 2. Click on the First Time User? Click Here link in the Sign In box. You will see the New Member Sign Up page. 3. Enter your [employer] Access Code exactly as it appears below. You will not need to use this code after youve completed the sign-up process. If you do not sign up before the expiration date, you must request a new code. · [employer] Access Code: [email or web address] Expires: 8/15/2017  4:29 PM 
 
4. Enter the last four digits of your Social Security Number (xxxx) and Date of Birth (mm/dd/yyyy) as indicated and click Submit. You will be taken to the next sign-up page. 5. Create a [employer] ID. This will be your [employer] login ID and cannot be changed, so think of one that is secure and easy to remember. 6. Create a [employer] password. You can change your password at any time. 7. Enter your Password Reset Question and Answer. This can be used at a later time if you forget your password. 8. Enter your e-mail address. You will receive e-mail notification when new information is available in [address] 9. Click Sign Up. You can now view and download portions of your medical record. 10. Click the Download Summary menu link to download a portable copy of your medical information. If you have questions, please visit the Frequently Asked Questions section of the [employer] website. Remember, [employer] is NOT to be used for urgent needs. For medical emergencies, dial 911. Now available from your iPhone and Android! Please provide this summary of care documentation to your next provider. Your primary care clinician is listed as Allyn Gaitan. If you have any questions after today's visit, please call [phone].

## 2017-07-20 NOTE — PROGRESS NOTES
34w6d. Patient doing well. Denies contractions, decreased fetal movement, vaginal bleeding, leak of fluid. GBS/GC/CT done today per patient request  RTO in 2 weeks. I have verbalized the plan of care with patient. The patient was given a full opportunity to ask questions, indicated that her questions had been answered and expressed understanding.

## 2017-07-21 DIAGNOSIS — Z34.83 PRENATAL CARE, SUBSEQUENT PREGNANCY, THIRD TRIMESTER: ICD-10-CM

## 2017-07-22 LAB
C TRACH RRNA SPEC QL NAA+PROBE: NEGATIVE
N GONORRHOEA RRNA SPEC QL NAA+PROBE: NEGATIVE
T VAGINALIS RRNA SPEC QL NAA+PROBE: NEGATIVE

## 2017-07-23 LAB
B-HEM STREP SPEC QL CULT: POSITIVE
GRBS, EXTERNAL: POSITIVE

## 2017-07-24 PROBLEM — Z22.330 GBS CARRIER: Status: ACTIVE | Noted: 2017-07-24

## 2017-08-03 ENCOUNTER — ROUTINE PRENATAL (OUTPATIENT)
Dept: OBGYN CLINIC | Age: 32
End: 2017-08-03

## 2017-08-03 VITALS
WEIGHT: 205 LBS | SYSTOLIC BLOOD PRESSURE: 112 MMHG | DIASTOLIC BLOOD PRESSURE: 73 MMHG | BODY MASS INDEX: 36.32 KG/M2 | HEART RATE: 94 BPM | HEIGHT: 63 IN

## 2017-08-03 DIAGNOSIS — Z34.93 PRENATAL CARE, THIRD TRIMESTER: Primary | ICD-10-CM

## 2017-08-03 DIAGNOSIS — Z3A.36 36 WEEKS GESTATION OF PREGNANCY: ICD-10-CM

## 2017-08-03 DIAGNOSIS — Z22.330 GBS CARRIER: ICD-10-CM

## 2017-08-03 NOTE — PROGRESS NOTES
36w6d.  irregular ctx. No LOF/VB. Normal fetal movement. GBS +  Labor precautions. Follow up 1 week. Plan of care discussed. Patient expressed understanding.

## 2017-08-03 NOTE — PATIENT INSTRUCTIONS
Week 37 of Your Pregnancy: Care Instructions  Your Care Instructions    You are near the end of your pregnancy--and you're probably pretty uncomfortable. It may be harder to walk around. Lying down probably isn't comfortable either. You may have trouble getting to sleep or staying asleep. Most women deliver their babies between 40 and 41 weeks. This is a good time to think about packing a bag for the hospital with items you'll need. Then you'll be ready when labor starts. Follow-up care is a key part of your treatment and safety. Be sure to make and go to all appointments, and call your doctor if you are having problems. It's also a good idea to know your test results and keep a list of the medicines you take. How can you care for yourself at home? Learn about breastfeeding  · Breastfeeding is best for your baby and good for you. · Breast milk has antibodies to help your baby fight infections. · Mothers who breastfeed often lose weight faster, because making milk burns calories. · Learning the best ways to hold your baby will make breastfeeding easier. · Let your partner bathe and diaper the baby to keep your partner from feeling left out. Snuggle together when you breastfeed. · You may want to learn how to use a breast pump and store your milk. · If you choose to bottle feed, make the feeding feel like breastfeeding so you can bond with your baby. Always hold your baby and the bottle. Do not prop bottles or let your baby fall asleep with a bottle. Learn about crying  · It is common for babies to cry for 1 to 3 hours a day. Some cry more, some cry less. · Babies don't cry to make you upset or because you are a bad parent. · Crying is how your baby communicates. Your baby may be hungry; have gas; need a diaper change; or feel cold, warm, tired, lonely, or tense. Sometimes babies cry for unknown reasons. · If you respond to your baby's needs, he or she will learn to trust you.   · Try to stay calm when your baby cries. Your baby may get more upset if he or she senses that you are upset. Know how to care for your   · Your baby's umbilical cord stump will drop off on its own, usually between 1 and 2 weeks. To care for your baby's umbilical cord area:  ¨ Clean the area at the bottom of the cord 2 or 3 times a day. ¨ Pay special attention to the area where the cord attaches to the skin. ¨ Keep the diaper folded below the cord. ¨ Use a damp washcloth or cotton ball to sponge bathe your baby until the stump has come off. · Your baby's first dark stool is called meconium. After the meconium is passed, your baby will develop his or her own bowel pattern. ¨ Some babies, especially  babies, have several bowel movements a day. Others have one or two a day, or one every 2 to 3 days. ¨  babies often have loose, yellow stools. Formula-fed babies have more formed stools. ¨ If your baby's stools look like little pellets, he or she is constipated. After 2 days of constipation, call your baby's doctor. · If your baby will be circumcised, you can care for him at home. ¨ Gently rinse his penis with warm water after every diaper change. Do not try to remove the film that forms on the penis. This film will go away on its own. Pat dry. ¨ Put petroleum ointment, such as Vaseline, on the area of the diaper that will touch your baby's penis. This will keep the diaper from sticking to your baby. ¨ Ask the doctor about giving your baby acetaminophen (Tylenol) for pain. Where can you learn more? Go to http://mo-carlota.info/. Enter 68 21 [id number] in the search box to learn more about \"Week 37 of Your Pregnancy: Care Instructions. \"  Current as of: 2017  Content Version: 11.3  © 9338-1574 [employer]. Care instructions adapted under license by [employer] (which disclaims liability or warranty for this information).  If you have questions about a medical condition or this instruction, always ask your healthcare professional. [place] [address] any warranty or liability for your use of this information.

## 2017-08-10 ENCOUNTER — ROUTINE PRENATAL (OUTPATIENT)
Dept: OBGYN CLINIC | Age: 32
End: 2017-08-10

## 2017-08-10 VITALS
HEIGHT: 63 IN | HEART RATE: 101 BPM | WEIGHT: 205 LBS | SYSTOLIC BLOOD PRESSURE: 122 MMHG | DIASTOLIC BLOOD PRESSURE: 83 MMHG | BODY MASS INDEX: 36.32 KG/M2

## 2017-08-10 DIAGNOSIS — Z3A.37 37 WEEKS GESTATION OF PREGNANCY: ICD-10-CM

## 2017-08-10 DIAGNOSIS — Z34.93 PRENATAL CARE, THIRD TRIMESTER: Primary | ICD-10-CM

## 2017-08-10 NOTE — PATIENT INSTRUCTIONS
Week 38 of Your Pregnancy: Care Instructions  Your Care Instructions    Believe it or not, your baby is almost here. You may have ideas about your baby's personality because of how much he or she moves. Or you may have noticed how he or she responds to sounds, warmth, cold, and light. You may even know what kind of music your baby likes. By now, you have a better idea of what to expect during delivery. You may have talked about your birth preferences with your doctor. But even if you want a vaginal birth, it is a good idea to learn about  births.  birth means that your baby is born through a cut (incision) in your lower belly. It is sometimes the best choice for the health of the baby and the mother. This care sheet can help you understand  births. It also gives you information about what to expect after your baby is born. And it helps you understand more about postpartum depression. Follow-up care is a key part of your treatment and safety. Be sure to make and go to all appointments, and call your doctor if you are having problems. It's also a good idea to know your test results and keep a list of the medicines you take. How can you care for yourself at home? Learn about  birth  · Most C-sections are unplanned. They are done because of problems that occur during labor. These problems might include:  ¨ Labor that slows or stops. ¨ High blood pressure or other problems for the mother. ¨ Signs of distress in the baby. These signs may include a very fast or slow heart rate. · Although most mothers and babies do well after , it is major surgery. It has more risks than a vaginal delivery. · In some cases, a planned  may be safer than a vaginal delivery. This may be the case if:  ¨ The mother has a health problem, such as a heart condition. ¨ The baby isn't in a head-down position for delivery. This is called a breech position.   ¨ The uterus has scars from past surgeries. This could increase the chance of a tear in the uterus. ¨ There is a problem with the placenta. ¨ The mother has an infection, such as genital herpes, that could be spread to the baby. ¨ The mother is having twins or more. ¨ The baby weighs 9 to 10 pounds or more. · Because of the risks of , planned C-sections generally should be done only for medical reasons. And a planned  should be done at 39 weeks or later unless there is a medical reason to do it sooner. Know what to expect after delivery, and plan for the first few weeks at home  · You, your baby, and your partner or  will get identification bands. Only people with matching bands can  the baby from the nursery. · You will learn how to feed, diaper, and bathe your baby. And you will learn how to care for the umbilical cord stump. If your baby will be circumcised, you will also learn how to care for that. · Ask people to wait to visit you until you are at home. And ask them to wash their hands before they touch your baby. · Make sure you have another adult in your home for at least 2 or 3 days after the birth. · During the first 2 weeks, limit when friends and family can visit. · Do not allow visitors who have colds or infections. Make sure all visitors are up to date with their vaccinations. Never let anyone smoke around your baby. · Try to nap when the baby naps. Be aware of postpartum depression  · \"Baby blues\" are common for the first 1 to 2 weeks after birth. You may cry or feel sad or irritable for no reason. · For some women, these feelings last longer and are more intense. This is called postpartum depression. · If your symptoms last for more than a few weeks or you feel very depressed, ask your doctor for help. · Postpartum depression can be treated. Support groups and counseling can help. Sometimes medicine can also help. Where can you learn more?   Go to http://mo-carlota.info/. Enter B044 in the search box to learn more about \"Week 38 of Your Pregnancy: Care Instructions. \"  Current as of: March 16, 2017  Content Version: 11.3  © 6973-4229 [employer], Incorporated. Care instructions adapted under license by [employer] (which disclaims liability or warranty for this information). If you have questions about a medical condition or this instruction, always ask your healthcare professional. [address] any warranty or liability for your use of this information.

## 2017-08-10 NOTE — PROGRESS NOTES
37w6d. Patient doing well. Denies contractions, decreased fetal movement, vaginal bleeding, leak of fluid. RTO in 1 week. I have verbalized the plan of care with patient. The patient was given a full opportunity to ask questions, indicated that her questions had been answered and expressed understanding.

## 2017-08-15 ENCOUNTER — HOSPITAL ENCOUNTER (INPATIENT)
Age: 32
LOS: 1 days | Discharge: HOME | DRG: 560 | End: 2017-08-16
Attending: OBSTETRICS & GYNECOLOGY | Admitting: OBSTETRICS & GYNECOLOGY
Payer: MEDICAID

## 2017-08-15 ENCOUNTER — ANESTHESIA EVENT (OUTPATIENT)
Dept: LABOR AND DELIVERY | Age: 32
DRG: 560 | End: 2017-08-15
Payer: MEDICAID

## 2017-08-15 ENCOUNTER — ANESTHESIA (OUTPATIENT)
Dept: LABOR AND DELIVERY | Age: 32
DRG: 560 | End: 2017-08-15
Payer: MEDICAID

## 2017-08-15 LAB
ABO + RH BLD: NORMAL
BASOPHILS # BLD AUTO: 0 K/UL (ref 0–0.06)
BASOPHILS # BLD: 0 % (ref 0–2)
BLOOD GROUP ANTIBODIES SERPL: NORMAL
DIFFERENTIAL METHOD BLD: ABNORMAL
EOSINOPHIL # BLD: 0.4 K/UL (ref 0–0.4)
EOSINOPHIL NFR BLD: 3 % (ref 0–5)
ERYTHROCYTE [DISTWIDTH] IN BLOOD BY AUTOMATED COUNT: 14 % (ref 11.6–14.5)
HCT VFR BLD AUTO: 36.2 % (ref 35–45)
HGB BLD-MCNC: 12.2 G/DL (ref 12–16)
LYMPHOCYTES # BLD AUTO: 19 % (ref 21–52)
LYMPHOCYTES # BLD: 2.3 K/UL (ref 0.9–3.6)
MCH RBC QN AUTO: 29.5 PG (ref 24–34)
MCHC RBC AUTO-ENTMCNC: 33.7 G/DL (ref 31–37)
MCV RBC AUTO: 87.4 FL (ref 74–97)
MONOCYTES # BLD: 0.8 K/UL (ref 0.05–1.2)
MONOCYTES NFR BLD AUTO: 6 % (ref 3–10)
NEUTS SEG # BLD: 8.8 K/UL (ref 1.8–8)
NEUTS SEG NFR BLD AUTO: 72 % (ref 40–73)
PLATELET # BLD AUTO: 245 K/UL (ref 135–420)
PMV BLD AUTO: 8.9 FL (ref 9.2–11.8)
RBC # BLD AUTO: 4.14 M/UL (ref 4.2–5.3)
SPECIMEN EXP DATE BLD: NORMAL
WBC # BLD AUTO: 12.3 K/UL (ref 4.6–13.2)

## 2017-08-15 PROCEDURE — 74011250636 HC RX REV CODE- 250/636: Performed by: OBSTETRICS & GYNECOLOGY

## 2017-08-15 PROCEDURE — 86900 BLOOD TYPING SEROLOGIC ABO: CPT | Performed by: OBSTETRICS & GYNECOLOGY

## 2017-08-15 PROCEDURE — 77030020255 HC SOL INJ LR 1000ML BG

## 2017-08-15 PROCEDURE — 74011000258 HC RX REV CODE- 258: Performed by: OBSTETRICS & GYNECOLOGY

## 2017-08-15 PROCEDURE — 75410000003 HC RECOV DEL/VAG/CSECN EA 0.5 HR

## 2017-08-15 PROCEDURE — 74011250636 HC RX REV CODE- 250/636: Performed by: NURSE ANESTHETIST, CERTIFIED REGISTERED

## 2017-08-15 PROCEDURE — 65270000029 HC RM PRIVATE

## 2017-08-15 PROCEDURE — 74011250636 HC RX REV CODE- 250/636

## 2017-08-15 PROCEDURE — 00HU33Z INSERTION OF INFUSION DEVICE INTO SPINAL CANAL, PERCUTANEOUS APPROACH: ICD-10-PCS | Performed by: ANESTHESIOLOGY

## 2017-08-15 PROCEDURE — 76060000078 HC EPIDURAL ANESTHESIA

## 2017-08-15 PROCEDURE — 85025 COMPLETE CBC W/AUTO DIFF WBC: CPT | Performed by: OBSTETRICS & GYNECOLOGY

## 2017-08-15 PROCEDURE — 74011000250 HC RX REV CODE- 250

## 2017-08-15 PROCEDURE — 75410000000 HC DELIVERY VAGINAL/SINGLE

## 2017-08-15 PROCEDURE — 77010026065 HC OXYGEN MINIMUM MEDICAL AIR

## 2017-08-15 PROCEDURE — 75410000002 HC LABOR FEE PER 1 HR

## 2017-08-15 PROCEDURE — 59025 FETAL NON-STRESS TEST: CPT

## 2017-08-15 PROCEDURE — 3E0R3CZ INTRODUCE REGIONAL ANESTH IN SPINAL CANAL, PERC: ICD-10-PCS | Performed by: ANESTHESIOLOGY

## 2017-08-15 PROCEDURE — 74011250637 HC RX REV CODE- 250/637: Performed by: OBSTETRICS & GYNECOLOGY

## 2017-08-15 PROCEDURE — 77030007879 HC KT SPN EPDRL TELE -B: Performed by: NURSE ANESTHETIST, CERTIFIED REGISTERED

## 2017-08-15 PROCEDURE — 10907ZC DRAINAGE OF AMNIOTIC FLUID, THERAPEUTIC FROM PRODUCTS OF CONCEPTION, VIA NATURAL OR ARTIFICIAL OPENING: ICD-10-PCS | Performed by: OBSTETRICS & GYNECOLOGY

## 2017-08-15 PROCEDURE — 77030034849

## 2017-08-15 RX ORDER — SODIUM CHLORIDE, SODIUM LACTATE, POTASSIUM CHLORIDE, CALCIUM CHLORIDE 600; 310; 30; 20 MG/100ML; MG/100ML; MG/100ML; MG/100ML
125 INJECTION, SOLUTION INTRAVENOUS CONTINUOUS
Status: DISCONTINUED | OUTPATIENT
Start: 2017-08-15 | End: 2017-08-15 | Stop reason: HOSPADM

## 2017-08-15 RX ORDER — AMOXICILLIN 250 MG
1 CAPSULE ORAL
Status: DISCONTINUED | OUTPATIENT
Start: 2017-08-15 | End: 2017-08-16 | Stop reason: HOSPADM

## 2017-08-15 RX ORDER — PROMETHAZINE HYDROCHLORIDE 25 MG/ML
25 INJECTION, SOLUTION INTRAMUSCULAR; INTRAVENOUS
Status: DISCONTINUED | OUTPATIENT
Start: 2017-08-15 | End: 2017-08-16 | Stop reason: HOSPADM

## 2017-08-15 RX ORDER — FENTANYL CITRATE 50 UG/ML
INJECTION, SOLUTION INTRAMUSCULAR; INTRAVENOUS
Status: COMPLETED
Start: 2017-08-15 | End: 2017-08-15

## 2017-08-15 RX ORDER — MAG HYDROX/ALUMINUM HYD/SIMETH 200-200-20
15 SUSPENSION, ORAL (FINAL DOSE FORM) ORAL
Status: DISCONTINUED | OUTPATIENT
Start: 2017-08-15 | End: 2017-08-15 | Stop reason: HOSPADM

## 2017-08-15 RX ORDER — BUPIVACAINE HYDROCHLORIDE 2.5 MG/ML
INJECTION, SOLUTION EPIDURAL; INFILTRATION; INTRACAUDAL AS NEEDED
Status: DISCONTINUED | OUTPATIENT
Start: 2017-08-15 | End: 2017-08-15 | Stop reason: HOSPADM

## 2017-08-15 RX ORDER — ACETAMINOPHEN 325 MG/1
650 TABLET ORAL
Status: DISCONTINUED | OUTPATIENT
Start: 2017-08-15 | End: 2017-08-16 | Stop reason: HOSPADM

## 2017-08-15 RX ORDER — LIDOCAINE HYDROCHLORIDE AND EPINEPHRINE 15; 5 MG/ML; UG/ML
INJECTION, SOLUTION EPIDURAL AS NEEDED
Status: DISCONTINUED | OUTPATIENT
Start: 2017-08-15 | End: 2017-08-15 | Stop reason: HOSPADM

## 2017-08-15 RX ORDER — MISOPROSTOL 200 UG/1
800 TABLET ORAL
Status: DISCONTINUED | OUTPATIENT
Start: 2017-08-15 | End: 2017-08-15 | Stop reason: HOSPADM

## 2017-08-15 RX ORDER — NALBUPHINE HYDROCHLORIDE 10 MG/ML
10 INJECTION, SOLUTION INTRAMUSCULAR; INTRAVENOUS; SUBCUTANEOUS
Status: DISCONTINUED | OUTPATIENT
Start: 2017-08-15 | End: 2017-08-15 | Stop reason: HOSPADM

## 2017-08-15 RX ORDER — METHYLERGONOVINE MALEATE 0.2 MG/ML
0.2 INJECTION INTRAVENOUS AS NEEDED
Status: DISCONTINUED | OUTPATIENT
Start: 2017-08-15 | End: 2017-08-15 | Stop reason: HOSPADM

## 2017-08-15 RX ORDER — TERBUTALINE SULFATE 1 MG/ML
0.25 INJECTION SUBCUTANEOUS
Status: DISCONTINUED | OUTPATIENT
Start: 2017-08-15 | End: 2017-08-15 | Stop reason: HOSPADM

## 2017-08-15 RX ORDER — SODIUM CHLORIDE 0.9 % (FLUSH) 0.9 %
5-10 SYRINGE (ML) INJECTION EVERY 8 HOURS
Status: DISCONTINUED | OUTPATIENT
Start: 2017-08-15 | End: 2017-08-15 | Stop reason: HOSPADM

## 2017-08-15 RX ORDER — CASTOR OIL 100 %
60 OIL (ML) ORAL
Status: COMPLETED | OUTPATIENT
Start: 2017-08-15 | End: 2017-08-15

## 2017-08-15 RX ORDER — SODIUM CHLORIDE 0.9 % (FLUSH) 0.9 %
5-10 SYRINGE (ML) INJECTION AS NEEDED
Status: DISCONTINUED | OUTPATIENT
Start: 2017-08-15 | End: 2017-08-15 | Stop reason: HOSPADM

## 2017-08-15 RX ORDER — OXYCODONE AND ACETAMINOPHEN 5; 325 MG/1; MG/1
2 TABLET ORAL
Status: DISCONTINUED | OUTPATIENT
Start: 2017-08-15 | End: 2017-08-16 | Stop reason: HOSPADM

## 2017-08-15 RX ORDER — IBUPROFEN 400 MG/1
800 TABLET ORAL
Status: DISCONTINUED | OUTPATIENT
Start: 2017-08-15 | End: 2017-08-16 | Stop reason: HOSPADM

## 2017-08-15 RX ORDER — OXYTOCIN 10 [USP'U]/ML
10 INJECTION, SOLUTION INTRAMUSCULAR; INTRAVENOUS
Status: DISCONTINUED | OUTPATIENT
Start: 2017-08-15 | End: 2017-08-15 | Stop reason: HOSPADM

## 2017-08-15 RX ORDER — LIDOCAINE HYDROCHLORIDE 10 MG/ML
30 INJECTION, SOLUTION EPIDURAL; INFILTRATION; INTRACAUDAL; PERINEURAL AS NEEDED
Status: DISCONTINUED | OUTPATIENT
Start: 2017-08-15 | End: 2017-08-15 | Stop reason: HOSPADM

## 2017-08-15 RX ORDER — FENTANYL CITRATE 50 UG/ML
100 INJECTION, SOLUTION INTRAMUSCULAR; INTRAVENOUS ONCE
Status: COMPLETED | OUTPATIENT
Start: 2017-08-15 | End: 2017-08-15

## 2017-08-15 RX ORDER — CARBOPROST TROMETHAMINE 250 UG/ML
250 INJECTION, SOLUTION INTRAMUSCULAR
Status: DISCONTINUED | OUTPATIENT
Start: 2017-08-15 | End: 2017-08-15 | Stop reason: HOSPADM

## 2017-08-15 RX ORDER — OXYTOCIN/RINGER'S LACTATE 20/1000 ML
500 PLASTIC BAG, INJECTION (ML) INTRAVENOUS ONCE
Status: COMPLETED | OUTPATIENT
Start: 2017-08-15 | End: 2017-08-15

## 2017-08-15 RX ORDER — ZOLPIDEM TARTRATE 5 MG/1
5 TABLET ORAL
Status: DISCONTINUED | OUTPATIENT
Start: 2017-08-15 | End: 2017-08-16 | Stop reason: HOSPADM

## 2017-08-15 RX ORDER — OXYTOCIN/RINGER'S LACTATE 20/1000 ML
125 PLASTIC BAG, INJECTION (ML) INTRAVENOUS CONTINUOUS
Status: DISCONTINUED | OUTPATIENT
Start: 2017-08-15 | End: 2017-08-15 | Stop reason: HOSPADM

## 2017-08-15 RX ORDER — ONDANSETRON 2 MG/ML
4 INJECTION INTRAMUSCULAR; INTRAVENOUS
Status: DISCONTINUED | OUTPATIENT
Start: 2017-08-15 | End: 2017-08-15 | Stop reason: HOSPADM

## 2017-08-15 RX ORDER — HYDROMORPHONE HYDROCHLORIDE 1 MG/ML
1 INJECTION, SOLUTION INTRAMUSCULAR; INTRAVENOUS; SUBCUTANEOUS
Status: DISCONTINUED | OUTPATIENT
Start: 2017-08-15 | End: 2017-08-15 | Stop reason: HOSPADM

## 2017-08-15 RX ADMIN — FENTANYL CITRATE 100 MCG: 50 INJECTION, SOLUTION INTRAMUSCULAR; INTRAVENOUS at 06:40

## 2017-08-15 RX ADMIN — SODIUM CHLORIDE 5 MILLION UNITS: 900 INJECTION INTRAVENOUS at 04:04

## 2017-08-15 RX ADMIN — Medication 10 ML: at 13:33

## 2017-08-15 RX ADMIN — SODIUM CHLORIDE, SODIUM LACTATE, POTASSIUM CHLORIDE, AND CALCIUM CHLORIDE 125 ML/HR: 600; 310; 30; 20 INJECTION, SOLUTION INTRAVENOUS at 05:35

## 2017-08-15 RX ADMIN — Medication 10 ML/HR: at 06:41

## 2017-08-15 RX ADMIN — CASTOR OIL 60 ML: 100 LIQUID ORAL at 10:50

## 2017-08-15 RX ADMIN — PENICILLIN G POTASSIUM 2.5 MILLION UNITS: 20000000 POWDER, FOR SOLUTION INTRAVENOUS at 07:43

## 2017-08-15 RX ADMIN — IBUPROFEN 800 MG: 400 TABLET, FILM COATED ORAL at 12:29

## 2017-08-15 RX ADMIN — ACETAMINOPHEN 650 MG: 325 TABLET, FILM COATED ORAL at 22:07

## 2017-08-15 RX ADMIN — BUPIVACAINE HYDROCHLORIDE 8 ML: 2.5 INJECTION, SOLUTION EPIDURAL; INFILTRATION; INTRACAUDAL at 06:38

## 2017-08-15 RX ADMIN — IBUPROFEN 800 MG: 400 TABLET, FILM COATED ORAL at 20:06

## 2017-08-15 RX ADMIN — SODIUM CHLORIDE, SODIUM LACTATE, POTASSIUM CHLORIDE, AND CALCIUM CHLORIDE 125 ML/HR: 600; 310; 30; 20 INJECTION, SOLUTION INTRAVENOUS at 04:04

## 2017-08-15 RX ADMIN — SODIUM CHLORIDE, SODIUM LACTATE, POTASSIUM CHLORIDE, AND CALCIUM CHLORIDE 1000 ML: 600; 310; 30; 20 INJECTION, SOLUTION INTRAVENOUS at 06:29

## 2017-08-15 RX ADMIN — LIDOCAINE HYDROCHLORIDE AND EPINEPHRINE 3 ML: 15; 5 INJECTION, SOLUTION EPIDURAL at 06:39

## 2017-08-15 RX ADMIN — SODIUM CHLORIDE, SODIUM LACTATE, POTASSIUM CHLORIDE, AND CALCIUM CHLORIDE 500 ML: 600; 310; 30; 20 INJECTION, SOLUTION INTRAVENOUS at 03:34

## 2017-08-15 RX ADMIN — Medication 10000 MILLI-UNITS/HR: at 10:53

## 2017-08-15 RX ADMIN — SODIUM CHLORIDE, SODIUM LACTATE, POTASSIUM CHLORIDE, AND CALCIUM CHLORIDE 300 ML: 600; 310; 30; 20 INJECTION, SOLUTION INTRAVENOUS at 08:10

## 2017-08-15 NOTE — PROGRESS NOTES
Problem: Falls - Risk of  Goal: *Absence of Falls  Document Ravi Fall Risk and appropriate interventions in the flowsheet.   Outcome: Progressing Towards Goal  Fall Risk Interventions:

## 2017-08-15 NOTE — H&P
History & Physical    Name: Dann Potter MRN: [id number]  SSN: [id number]    YOB: 1985  Age: 33 y.o. Sex: female        Subjective:     Estimated Date of Delivery: 17  OB History      Para Term  AB Living    3 2 2   2    SAB TAB Ectopic Molar Multiple Live Births         2          Ms. Lili Regan is admitted with pregnancy at 38w4d for active labor. Prenatal course was normal. Please see prenatal records for details. She presented last night to L&D complaining of contractions. She was 4 cm and changed her cervix to 5 cm. She was admitted, started on PCN prophylaxis and had SROM with thin meconium @ 0600. Her infant has been intermittently reactive. Past Medical History:   Diagnosis Date    11 weeks gestation of pregnancy 2017    Mild intermittent asthma with acute exacerbation 2017    Nausea and vomiting in pregnancy prior to 22 weeks gestation 2017    Sciatica of left side 2017     No past surgical history on file. Social History     Occupational History    Not on file. Social History Main Topics    Smoking status: Never Smoker    Smokeless tobacco: Never Used    Alcohol use Yes      Comment: none since pregnancy    Drug use: No    Sexual activity: Yes     Partners: Male     No family history on file. No Known Allergies  Prior to Admission medications    Medication Sig Start Date End Date Taking? Authorizing Provider   pnv w/o calcium-iron fum-fa 27-1 mg tab Take  by mouth. Yes Historical Provider        Review of Systems: A comprehensive review of systems was negative except for that written in the HPI.     Objective:     Vitals:  Vitals:    08/15/17 0752 08/15/17 0753 08/15/17 0754 08/15/17 0840   BP: 98/59  96/59    Pulse: 80  86    Temp:    98.4 °F (36.9 °C)   SpO2:  97%     Weight:       Height:            Physical Exam:  Heart: Regular rate and rhythm  Lung: clear to auscultation throughout lung fields, no wheezes, no rales, no rhonchi and normal respiratory effort  Abdomen: soft, nontender  Fundus: soft and non tender  Perineum: blood absent, amniotic fluid present- thin meconium  Cervical Exam: 10 cm dilated    100% effaced    +2 station    Presenting Part: cephalic  Lower Extremities:  - Edema No  Membranes:  Spontaneous Rupture of Membranes; Amniotic Fluid: thin meconium fluid  Fetal Heart Rate: Variability: moderate, minimal    Prenatal Labs:   Lab Results   Component Value Date/Time    Rubella, External Immune 2017    GrBStrep, External positive 2017    HBsAg, External negative 2017    HIV, External Non reactive 2017    RPR, External Nrgative 2017    Gonorrhea, External Negative 2017         Assessment/Plan:     Plan: Admit for Labor  Progressing normally, Continue plan for vaginal delivery. Group B Strep was positive, will treat prophylactically with penicillin. Anticipate .   Signed By:  Bashir Bui MD     August 15, 2017

## 2017-08-15 NOTE — PROGRESS NOTES
1920    Anesthesia at bedside.     0630       Time out    0635      Procedure start    0638         Cath in    0639      Test dose

## 2017-08-15 NOTE — ROUTINE PROCESS
TRANSFER - IN REPORT:    Verbal report received from Julissa RN(name) on Eliza Branes  being received from Labor and Delivery (unit) for routine progression of care      Report consisted of patients Situation, Background, Assessment and   Recommendations(SBAR). Information from the following report(s) SBAR, Kardex, Intake/Output, MAR and Recent Results was reviewed with the receiving nurse. Opportunity for questions and clarification was provided. Care assumed. 1400--Nurse introduced to patient. Went over plan of care. Patient requested rest. Nurse will complete assessment within 1 hour. 1500--Assessment completed. Vitals stable. Educated patient on normal bleeding and when to call nurse for assistance. Fundus firm, lochia small. Hep lock IV removed. 1830--Mother doing well. Up without complaints. Voiding without problems. Pain managed well with motrin. Bonding well with baby.

## 2017-08-15 NOTE — PROGRESS NOTES
Notified by Nursery RN who was interviewing pt that pt is ready for her epidural. SVE done- 6/90/-1, fluid bolus started.  Notified primary RN

## 2017-08-15 NOTE — PROGRESS NOTES
Pt to L&D with her family with c/o contractions q 7 mins that started last night at 2200. Pt is , 38.4, GBS +, IBOW. EFM and TOCO explained and placed. Initital . SVE /-2. Will notify MD of pt's arrival and status.

## 2017-08-15 NOTE — PROGRESS NOTES
Reg diet served  Nursery nurse in room with infant  1340  pericare done  Iv pitocin infused and ivl flushed with ns  Up to br voided qs  pericare inst   1350  Moved to [facility] via w/c  inst on callbell   1400  Report to shun contreras rn

## 2017-08-15 NOTE — ROUTINE PROCESS
Dr Debby Minaya updated on pts condition via Sbar. Cervix not changed but MERARY feels bulging on this last check. Orders to recheck in one hr. Poc  Updated with pt who is lying in bed comfortably but states ucs q 2-3 and 6-7 on pain scale. 9768 Dr Debby Minaya updated on pts change of cervix. Pt for admision and antibiotics. 0530 Continues to cope well,MOVING LEGS WITH UCS, declines intervention. [id number] Anaesthesia paged for pts Epidural.  0615 Anaesthesia returned  Page, informed of need for epidural .  [id number]. Pt more comfortable , Srom for meconium stained fluid. Dr Debby Minaya notified, wbi. Pt would prefer to wait on Epidural for another while , remembers bad experience previously.

## 2017-08-15 NOTE — PROGRESS NOTES
1043:  of VFI with spontaneous respirations. Baby to warmer. Baby being tended to by nursery nurse. 1100: Spontaneous delivery of placenta. Perineum inspected by CNM. Perineum intact. Cierra-care done, Ice pack applied. Mom declines skin to skin. Family together for magic hour, side rails up and call light in reach, FOB at bedside. Patient instructed not to get for the first time without nurse at bedside and to call with increased bleeding. Called dietary for lunch tray. Juice provided.

## 2017-08-15 NOTE — ANESTHESIA PROCEDURE NOTES
Epidural Block    Start time: 8/15/2017 6:29 AM  End time: 8/15/2017 6:50 AM  Performed by: Dheeraj Johnson  Authorized by: Dheeraj Johnson     Pre-Procedure  Indication: labor epidural    Preanesthetic Checklist: patient identified, risks and benefits discussed, anesthesia consent, site marked, patient being monitored, timeout performed and anesthesia consent    Timeout Time: 06:35        Epidural:   Patient position:  Seated  Prep region:  Lumbar  Prep: Betadine and Patient draped    Location:  L3-4    Needle and Epidural Catheter:   Needle Type:  Tuohy  Needle Gauge:  18 G  Injection Technique:  Loss of resistance using air  Attempts:  1  Catheter Size:  19 G  Catheter at Skin Depth (cm):  8  Depth in Epidural Space (cm):  3  Events: no blood with aspiration, no cerebrospinal fluid with aspiration, no paresthesia and negative aspiration test    Test Dose:  Lidocaine 1.5% w/ epi and negative    Assessment:   Catheter Secured:  Tape and tegaderm  Insertion:  Uncomplicated  Patient tolerance:  Patient tolerated the procedure well with no immediate complications  Fentanyl 273 mcg via Epidural cath

## 2017-08-15 NOTE — IP AVS SNAPSHOT
[address] Patient: Edson Garrido MRN: [id number] :1985 You are allergic to the following No active allergies Recent Documentation Height Weight Breastfeeding? BMI OB Status Smoking Status 1.6 m 93 kg Yes 36.31 kg/m2 Recent pregnancy Never Smoker Emergency Contacts Name Discharge Info Relation Home Work Mobile Ramsey Dukes DISCHARGE CAREGIVER [3] Boyfriend [17]   [phone] About your hospitalization You were admitted on:  August 15, 2017 You last received care in the:  [place] [address] You were discharged on:  2017 Unit phone number:  [phone] Why you were hospitalized Your primary diagnosis was:  Not on File Your diagnoses also included:  Labor And Delivery, Indication For Care Providers Seen During Your Hospitalizations Provider Role Specialty Primary office phone Kait Willson DO Attending Provider Obstetrics & Gynecology [phone] Your Primary Care Physician (PCP) Primary Care Physician Office Phone Office Fax Santosh Batista [phone] Follow-up Information Follow up With Details Comments Contact Info [facility] OB/GYN Schedule an appointment as soon as possible for a visit in 6 weeks postpartum check [place] [id number] [address]) [address] [phone] MD [address] [facility] [address] [address] [phone] Your Appointments  11:45 AM EDT  
OB VISIT with Nel Albrecht DO  
[facility] OB/GYN ([facility]) [facility] [address] [phone] Current Discharge Medication List  
  
START taking these medications Dose & Instructions Dispensing Information Comments Morning Noon Evening Bedtime  
 ibuprofen 800 mg tablet Commonly known as:  MOTRIN Your last dose was: Your next dose is:    
   
   
 Dose:  800 mg Take 1 Tab by mouth every eight (8) hours as needed. Quantity:  30 Tab Refills:  0 CONTINUE these medications which have NOT CHANGED Dose & Instructions Dispensing Information Comments Morning Noon Evening Bedtime  
 pnv w/o calcium-iron fum-fa 27-1 mg Tab Your last dose was: Your next dose is: Take  by mouth. Refills:  0 Where to Get Your Medications These medications were sent to [address] [facility], [address] [facility]  [address] [facility], [address] [facility] [address] Hours:  24-hours Phone:  [phone]  
  ibuprofen 800 mg tablet Discharge Instructions None Discharge Instructions Attachments/References DEPRESSION: POSTPARTUM (ENGLISH) BABY-FRIENDLY BREASTFEEDING: GENERAL INFO (ENGLISH) POSTPARTUM (ENGLISH) Discharge Orders None Introducing [facility]! Vicky Palma introduces [employer] patient portal. Now you can access parts of your medical record, email your doctor's office, and request medication refills online. 1. In your internet browser, go to [email or web address] 2. Click on the First Time User? Click Here link in the Sign In box. You will see the New Member Sign Up page. 3. Enter your [employer] Access Code exactly as it appears below. You will not need to use this code after youve completed the sign-up process. If you do not sign up before the expiration date, you must request a new code. · [employer] Access Code: [facility] Expires: 11/14/2017  1:39 PM 
 
4. Enter the last four digits of your Social Security Number (xxxx) and Date of Birth (mm/dd/yyyy) as indicated and click Submit. You will be taken to the next sign-up page. 5. Create a Scholrly ID. This will be your Sch[employer] login ID and cannot be changed, so think of one that is secure and easy to remember. 6. Create a Sch[employer] password. You can change your password at any time. 7. Enter your Password Reset Question and Answer. This can be used at a later time if you forget your password. 8. Enter your e-mail address. You will receive e-mail notification when new information is available in [address] 9. Click Sign Up. You can now view and download portions of your medical record. 10. Click the Download Summary menu link to download a portable copy of your medical information. If you have questions, please visit the Frequently Asked Questions section of the Sch[employer] website. Remember, [employer] is NOT to be used for urgent needs. For medical emergencies, dial 911. Now available from your iPhone and Android! General Information Please provide this summary of care documentation to your next provider. Patient Signature:  ____________________________________________________________ Date:  ____________________________________________________________  
  
Chris Aviless Provider Signature:  ____________________________________________________________ Date:  ____________________________________________________________ More Information Depression After Childbirth: Care Instructions Your Care Instructions Many women get the \"baby blues\" during the first few days after childbirth. You may lose sleep, feel irritable, and cry easily. You may feel happy one minute and sad the next. Hormone changes are one cause of these emotional changes. Also, the demands of a new baby, along with visits from relatives or other family needs, add to a mother's stress. The \"baby blues\" often peak around the fourth day. Then they ease up in less than 2 weeks.  
If your moodiness or anxiety lasts for more than 2 weeks, or if you feel like life is not worth living, you may have postpartum depression. This is different for each mother. Some mothers with serious depression may worry intensely about their infant's well-being. Others may feel distant from their child. Some mothers might even feel that they might harm their baby. A mother may have signs of paranoia, wondering if someone is watching her. Depression is not a sign of weakness. It is a medical condition that requires treatment. Medicine and counseling often work well to reduce depression. Talk to your doctor about taking antidepressant medicine while breastfeeding. Follow-up care is a key part of your treatment and safety. Be sure to make and go to all appointments, and call your doctor if you are having problems. It's also a good idea to know your test results and keep a list of the medicines you take. How do you know if you are depressed? With all the changes in your life, you may not know if you are depressed. Pregnancy sometimes causes changes in how you feel that are similar to the symptoms of depression. Symptoms of depression include: · Feeling sad or hopeless and losing interest in daily activities. These are the most common symptoms of depression. · Sleeping too much or not enough. · Feeling tired. You may feel as if you have no energy. · Eating too much or too little. · Writing or talking about death, such as writing suicide notes or talking about guns, knives, or pills. Keep the numbers for these national suicide hotlines: 8-126-403-TALK (0-823.846.2074) and 0-835-GNASOCH (0-256.620.9791). If you or someone you know talks about suicide or feeling hopeless, get help right away. How can you care for yourself at home? · Be safe with medicines. Take your medicines exactly as prescribed. Call your doctor if you think you are having a problem with your medicine. · Eat a healthy diet so that you can keep up your energy. · Get regular daily exercise, such as walks, to help improve your mood. · Get as much sunlight as possible. Keep your shades and curtains open. Get outside as much as you can. · Avoid using alcohol or other substances to feel better. · Get as much rest and sleep as possible. Avoid doing too much. Being too tired can increase depression. · Play stimulating music throughout your day and soothing music at night. · Schedule outings and visits with friends and family. Ask them to call you regularly, so that you do not feel alone. · Ask for help with preparing food and other daily tasks. Family and friends are often happy to help a mother with a . · Be honest with yourself and those who care about you. Tell them about your struggle. · Join a support group of new mothers. No one can better understand the challenges of caring for a  than other new mothers. · If you feel like life is not worth living or are feeling hopeless, get help right away. Keep the numbers for these national suicide hotlines: 3-104-215-TALK (0-249-925-355.370.6830) and 8-139-RQTYSGV (2-603.328.4674). When should you call for help? Call 911 anytime you think you may need emergency care. For example, call if: 
· You feel you cannot stop from hurting yourself, your baby, or someone else. Call your doctor now or seek immediate medical care if: 
· You are having trouble caring for yourself or your baby. · You hear voices. Watch closely for changes in your health, and be sure to contact your doctor if: 
· You have problems with your depression medicine. · You do not get better as expected. Where can you learn more? Go to http://mo-carlota.info/. Enter K586 in the search box to learn more about \"Depression After Childbirth: Care Instructions. \" Current as of: 2016 Content Version: 11.3 © 9704-3594 [facility].  Care instructions adapted under license by [address] (which disclaims liability or warranty for this information). If you have questions about a medical condition or this instruction, always ask your healthcare professional. [address] any warranty or liability for your use of this information. Learning About Starting to Breastfeed Planning ahead Before your baby is born, plan ahead. Learn all you can about breastfeeding. This helps make breastfeeding easier. · Early in your pregnancy, talk to your doctor or midwife about breastfeeding. · Learn the basics before your baby is born. The staff at hospitals and birthing centers can help you find a lactation specialist. This person is often a nurse who has been trained to teach and advise women about breastfeeding. Or you can take a breastfeeding class. · Plan ahead for times when you will need help after your baby is born. Many women get help from friends and family. Some join a support group to talk to other moms who breastfeed. · Buy the equipment you'll need. Examples are breast pads, nipple cream, extra pillows, and nursing bras. Find out about breast pumps too. Getting help from your hospital or birthing center It's important to have support from the doctors, nurses, and hospital staff who care for you and your baby. Before it's time for you to give birth, ask about the breastfeeding policies at your hospital or birthing center. Look for a hospital or birthing center that has policies for: · \"Rooming in. \" This policy encourages you to have your baby in the room with you. It can allow you to breastfeed more often. · Supplemental feedings. Tell the staff that your baby is to get only your breast milk from birth. If staff feed your baby water, sugar solution, or formula right after birth without a medical reason, it may make it harder for you to breastfeed. · Pacifiers or artificial nipples.  Staff should not give your  these items without your permission. They may interfere with breastfeeding. · Follow-up. Find out if your hospital can help you with breastfeeding issues after you go home. See if you can get information on support groups or other contacts. They might help if you need help setting up and staying with your breastfeeding routine. Your first feeding It's best to start breastfeeding within 1 hour of birth. For each feeding, you go through these basic steps: · Get ready for the feeding. Be calm and relaxed, and try not to be distracted. Get some water or juice for yourself. Use two or three pillows to help support your baby while he or she is nursing. · Find a breastfeeding position that is comfortable for you and your baby. Examples are the cradle and the football positions. Make sure the baby's head and chest are lined up straight and facing your breast. It's best to switch which breast you start with each time. · Get the baby latched on well. Your baby's mouth needs to be wide open, like a yawn, so you may need to gently touch the middle of your baby's lower lip. When your baby's mouth is open wide, quickly bring the baby onto your nipple and areola. The areola is the dark [place] around your nipple. · Provide a complete feeding. Let your baby nurse for at least 15 minutes. Be sure to burp your baby after each breast. 
In the first days after birth, your breasts make a thick, yellow liquid called colostrum. This liquid gives your baby nutrients and antibodies against infection. It is all that babies need at first. Your breasts will fill with milk a few days after the birth. Talk to your doctor, midwife, or lactation specialist right away if you are having problems and aren't sure what to do. How often to breastfeed Plan to breastfeed your baby on demand rather than setting a strict schedule.  For the first few days, be prepared to breastfeed every 1 to 3 hours. That often works out to about 8 to 12 times in a 24-hour period. Wake a sleeping baby to feed, if you need to. If you breastfeed more often, it will help your breasts to produce more milk. After you go home After you're home, don't be afraid to call your doctor, midwife, or lactation specialist with questions. That's true even if you don't know what's bothering you. They are used to parents of newborns calling. They can help you figure out if there is a problem, and if so, how to fix it. Plan for times when you will be apart from your baby. Use a breast pump to collect breast milk ahead of time. You can store milk in the refrigerator or freezer. Then it's ready when someone else will be taking care of your baby. Breastfeeding is a learned skill that gets easier over time. You are more likely to succeed if you plan ahead, learn the basic techniques, and know where to get help and support. Where can you learn more? Go to http://mo-carlota.info/. Enter O334 in the search box to learn more about \"Learning About Starting to Breastfeed. \" Current as of: 2017 Content Version: 11.3 © 9223-4085 [employer]. Care instructions adapted under license by [employer] (which disclaims liability or warranty for this information). If you have questions about a medical condition or this instruction, always ask your healthcare professional. [place] [address] any warranty or liability for your use of this information. After Your Delivery (the Postpartum Period): Care Instructions Your Care Instructions Congratulations on the birth of your baby. Like pregnancy, the  period can be a time of excitement, arleen, and exhaustion. You may look at your wondrous little baby and feel happy. You may also be overwhelmed by your new sleep hours and new responsibilities. At first, babies often sleep during the days and are awake at night.  They do not have a pattern or routine. They may make sudden gasps, jerk themselves awake, or look like they have crossed eyes. These are all normal, and they may even make you smile. In these first weeks after delivery, try to take good care of yourself. It may take 4 to 6 weeks to feel like yourself again, and possibly longer if you had a  birth. You will likely feel very tired for several weeks. Your days will be full of ups and downs, but lots of arleen as well. Follow-up care is a key part of your treatment and safety. Be sure to make and go to all appointments, and call your doctor if you are having problems. It's also a good idea to know your test results and keep a list of the medicines you take. How can you care for yourself at home? Take care of your body after delivery · Use pads instead of tampons for the bloody flow that may last as long as 2 weeks. · Ease cramps with ibuprofen (Advil, Motrin). · Ease soreness of hemorrhoids and the area between your vagina and rectum with ice compresses or witch hazel pads. · Ease constipation by drinking lots of fluid and eating high-fiber foods. Ask your doctor about over-the-counter stool softeners. · Cleanse yourself with a gentle squeeze of warm water from a bottle instead of wiping with toilet paper. · Take a sitz bath in warm water several times a day. · Wear a good nursing bra. Ease sore and swollen breasts with warm, wet washcloths. · If you are not breastfeeding, use ice rather than heat for breast soreness. · Your period may not start for several months if you are breastfeeding. You may bleed more, and longer at first, than you did before you got pregnant. · Wait until you are healed (about 4 to 6 weeks) before you have sexual intercourse. Your doctor will tell you when it is okay to have sex. · Try not to travel with your baby for 5 or 6 weeks. If you take a long car trip, make frequent stops to walk around and stretch. Avoid exhaustion · Rest every day. Try to nap when your baby naps. · Ask another adult to be with you for a few days after delivery. · Plan for  if you have other children. · Stay flexible so you can eat at odd hours and sleep when you need to. Both you and your baby are making new schedules. · Plan small trips to get out of the house. Change can make you feel less tired. · Ask for help with housework, cooking, and shopping. Remind yourself that your job is to care for your baby. Know about help for postpartum depression · \"Baby blues\" are common for the first 1 to 2 weeks after birth. You may cry or feel sad or irritable for no reason. · Rest whenever you can. Being tired makes it harder to handle your emotions. · Go for walks with your baby. · Talk to your partner, friends, and family about your feelings. · If your symptoms last for more than a few weeks, or if you feel very depressed, ask your doctor for help. · Postpartum depression can be treated. Support groups and counseling can help. Sometimes medicine can also help. Stay healthy · Eat healthy foods so you have more energy, make good breast milk, and lose extra baby pounds. · If you breastfeed, avoid alcohol and drugs. Stay smoke-free. If you quit during pregnancy, congratulations. · Start daily exercise after 4 to 6 weeks, but rest when you feel tired. · Learn exercises to tone your belly. Do Kegel exercises to regain strength in your pelvic muscles. You can do these exercises while you stand or sit. ¨ Squeeze the same muscles you would use to stop your urine. Your belly and thighs should not move. ¨ Hold the squeeze for 3 seconds, and then relax for 3 seconds. ¨ Start with 3 seconds. Then add 1 second each week until you are able to squeeze for 10 seconds. ¨ Repeat the exercise 10 to 15 times for each session. Do three or more sessions each day. · Find a class for new mothers and new babies that has an exercise time. · If you had a  birth, give yourself a bit more time before you exercise, and be careful. When should you call for help? Call 911 anytime you think you may need emergency care. For example, call if: 
· You passed out (lost consciousness). Call your doctor now or seek immediate medical care if: 
· You have severe vaginal bleeding. This means you are passing blood clots and soaking through a pad each hour for 2 or more hours. · You are dizzy or lightheaded, or you feel like you may faint. · You have a fever. · You have new belly pain, or your pain gets worse. Watch closely for changes in your health, and be sure to contact your doctor if: 
· Your vaginal bleeding seems to be getting heavier. · You have new or worse vaginal discharge. · You feel sad, anxious, or hopeless for more than a few days. · You do not get better as expected. Where can you learn more? Go to http://mo-carlota.info/. Enter A461 in the search box to learn more about \"After Your Delivery (the Postpartum Period): Care Instructions. \" Current as of: 2017 Content Version: 11.3 © 1319-3327 [employer]. Care instructions adapted under license by [employer] (which disclaims liability or warranty for this information). If you have questions about a medical condition or this instruction, always ask your healthcare professional. [place] [address] any warranty or liability for your use of this information.

## 2017-08-15 NOTE — L&D DELIVERY NOTE
Delivery Summary    Patient: Jaylen Anderson MRN: [id number]  SSN: [id number]    YOB: 1985  Age: 33 y.o. Sex: female        Labor Events:    Labor: No    Rupture Date: 8/15/2017    Rupture Time: 9:05 AM    Rupture Type    Delivery Summary    Patient: Jaylen Anderson MRN: [id number]  SSN: [id number]    YOB: 1985  Age: 33 y.o. Sex: female        Labor Events:    Labor: No    Rupture Date: 8/15/2017    Rupture Time: 9:05 AM    Rupture Type AROM    Amniotic Fluid Volume:      Amniotic Fluid Description: Meconium  None    Induction: None        Augmentation: None    Labor Complications: None     Additional Complications:        Cervical Ripening:       None      Delivery Events:  Episiotomy: None    Laceration(s): None      Repaired: None     Number of Repair Packets:      Suture Type and Size:         Estimated Blood Loss (ml): 350        Information for the patient's :  Dami Dockery [[id number]]     Delivery Summary - Baby    Delivery Date: 8/15/2017   Delivery Time: 10:53 AM   Delivery Type: Vaginal, Spontaneous Delivery  Sex:  female  Gestational Age: 38w4d  Delivery Clinician:  Jermaine Rao  Living?: Living   Delivery Location: L&D             APGARS  One minute Five minutes Ten minutes   Skin Color: 0    1       Heart Rate: 2   2         Reflex Irritability: 2   2         Muscle Tone: 2   2       Respiration: 2   2         Total: 8   9           Presentation: Vertex  Position: Right Occiput Anterior  Resuscitation Method:  Suctioning-bulb; Tactile Stimulation     Meconium Stained:  Thick    Cord Information: 3 Vessels   Complications: None  Cord Blood Sent?:  Yes    Blood Gases Sent?:  No    Placenta:  Date/Time: 8/15 11:10 AM  Removal: Spontaneous      Appearance: Normal      Measurements:  Birth Weight: 7 lb 0.7 oz (3.195 kg)    Birth Length: 1' 7.29\" (0.49 m)   Head Circumference: 1' 1.19\" (0.335 m)     Chest Circumference: 1' 0.99\" (0.33 m)    Abdominal Girth: 1' 1.19\" (0.335 m)    Other Providers:   YESENIA DUNNE;TINO LEMUS;MAJO REYNOSO;;MISSAEL BRUNSON;LISHA PALACIOS;JOSE FONTENOT Obstetrician;Primary Nurse;Pediatrician; Anesthesiologist;Crna;Nursery Nurse;Staff Nurse           Cord Blood Results:  Information for the patient's :  Payton Werner [[id number]]     Lab Results   Component Value Date/Time    ABORH O POSITIVE 08/15/2017 11:15 AM    PCTDIG NEG 08/15/2017 11:15 AM     Information for the patient's :  Payton Werner [[id number]]   No results found for: APH, APCO2, APO2, AHCO3, ABEC, ABDC, O2ST, [place], [place], PHI, [place], PO2I, [place], SO2I, IBD     Information for the patient's :  Payton Werner [[id number]]   No results found for: EPHV, PCO2V, PO2V, HCO3V, O2STV, EBDV     AROM    Amniotic Fluid Volume:      Amniotic Fluid Description: Meconium     Induction: None        Augmentation: None    Labor Complications: None     Additional Complications:        Cervical Ripening:       None      Delivery Events:  Episiotomy: None    Laceration(s): None      Repaired: None     Number of Repair Packets:      Suture Type and Size:         Estimated Blood Loss (ml): 350        Information for the patient's :  Payton Werner [[id number]]     Delivery Summary - Baby    Delivery Date: 8/15/2017   Delivery Time: 10:53 AM   Delivery Type: Vaginal, Spontaneous Delivery  Sex:  female  Gestational Age: 38w4d  Delivery Clinician:  Linden Jj  Living?: Living   Delivery Location: L&D             APGARS  One minute Five minutes Ten minutes   Skin Color: 0    1       Heart Rate: 2   2         Reflex Irritability: 2   2         Muscle Tone: 2   2       Respiration: 2   2         Total: 8   9           Presentation: Vertex  Position: Right Occiput Anterior  Resuscitation Method:  Suctioning-bulb; Tactile Stimulation     Meconium Stained:  Thick    Cord Information: 3 Vessels   Complications: None  Cord Blood Sent?:  Yes    Blood Gases Sent?:  No    Placenta:  Date/Time: 8/15 11:10 AM  Removal: Spontaneous      Appearance: Normal     [place] Measurements:  Birth Weight: 7 lb 0.7 oz (3.195 kg)    Birth Length: 1' 7.29\" (0.49 m)   Head Circumference: 1' 1.19\" (0.335 m)     Chest Circumference: 1' 0.99\" (0.33 m)    Abdominal Girth: 1' 1.19\" (0.335 m)    Other Providers:   YESENIA DUNNE;TINO LEMUS;MAJO REYNOSO;;MISSAEL BRUNSON;LISHA PALACIOS;JOSE FONTENOT Obstetrician;Primary Nurse;Pediatrician; Anesthesiologist;Crna;Nursery Nurse;Staff Nurse           Cord Blood Results:  Information for the patient's :  Norman Champagne [[id number]]     Lab Results   Component Value Date/Time    ABORH O POSITIVE 08/15/2017 11:15 AM    PCTDIG NEG 08/15/2017 11:15 AM     Information for the patient's :  Norman Champagne [[id number]]   No results found for: APH, APCO2, APO2, AHCO3, ABEC, ABDC, O2ST, [place], [place], PHI, [place], PO2I, HCO3I, SO2I, IBD     Information for the patient's :  Norman Champagne [[id number]]   No results found for: EPHV, PCO2V, PO2V, HCO3V, O2STV, EBDV

## 2017-08-15 NOTE — IP AVS SNAPSHOT
[address] Patient: Gloria Milan MRN: [id number] :1985 Current Discharge Medication List  
  
START taking these medications Dose & Instructions Dispensing Information Comments Morning Noon Evening Bedtime  
 ibuprofen 800 mg tablet Commonly known as:  MOTRIN Your last dose was: Your next dose is:    
   
   
 Dose:  800 mg Take 1 Tab by mouth every eight (8) hours as needed. Quantity:  30 Tab Refills:  0 CONTINUE these medications which have NOT CHANGED Dose & Instructions Dispensing Information Comments Morning Noon Evening Bedtime  
 pnv w/o calcium-iron fum-fa 27-1 mg Tab Your last dose was: Your next dose is: Take  by mouth. Refills:  0 Where to Get Your Medications These medications were sent to [address]  [address] [facility], [address] [facility] [address] Hours:  24-hours Phone:  [phone]  
  ibuprofen 800 mg tablet

## 2017-08-15 NOTE — PROGRESS NOTES
Verbal shift change report given to Christiana Nunn RN and Chinmay RN (oncoming nurse) by Nora Salas RN (offgoing nurse). Report included the following information SBAR.

## 2017-08-16 VITALS
HEART RATE: 77 BPM | DIASTOLIC BLOOD PRESSURE: 78 MMHG | BODY MASS INDEX: 36.32 KG/M2 | OXYGEN SATURATION: 100 % | HEIGHT: 63 IN | WEIGHT: 205 LBS | RESPIRATION RATE: 16 BRPM | TEMPERATURE: 97.5 F | SYSTOLIC BLOOD PRESSURE: 113 MMHG

## 2017-08-16 LAB
HCT VFR BLD AUTO: 38.1 % (ref 35–45)
HGB BLD-MCNC: 12.6 G/DL (ref 12–16)

## 2017-08-16 PROCEDURE — 85014 HEMATOCRIT: CPT | Performed by: OBSTETRICS & GYNECOLOGY

## 2017-08-16 PROCEDURE — 74011250637 HC RX REV CODE- 250/637: Performed by: OBSTETRICS & GYNECOLOGY

## 2017-08-16 PROCEDURE — 36415 COLL VENOUS BLD VENIPUNCTURE: CPT | Performed by: OBSTETRICS & GYNECOLOGY

## 2017-08-16 PROCEDURE — 85018 HEMOGLOBIN: CPT | Performed by: OBSTETRICS & GYNECOLOGY

## 2017-08-16 RX ORDER — IBUPROFEN 800 MG/1
800 TABLET ORAL
Qty: 30 TAB | Refills: 0 | Status: SHIPPED | OUTPATIENT
Start: 2017-08-16

## 2017-08-16 RX ADMIN — IBUPROFEN 800 MG: 400 TABLET, FILM COATED ORAL at 14:55

## 2017-08-16 RX ADMIN — ACETAMINOPHEN 650 MG: 325 TABLET, FILM COATED ORAL at 03:15

## 2017-08-16 RX ADMIN — ACETAMINOPHEN 650 MG: 325 TABLET, FILM COATED ORAL at 12:23

## 2017-08-16 RX ADMIN — IBUPROFEN 800 MG: 400 TABLET, FILM COATED ORAL at 05:35

## 2017-08-16 NOTE — ROUTINE PROCESS
Verbal shift change report given to Tena Gautam RN (oncoming nurse) by LISET Dunn RN (offgoing nurse). Report included the following information SBAR, Kardex, Intake/Output, MAR and Recent Results.

## 2017-08-16 NOTE — ANESTHESIA POSTPROCEDURE EVALUATION
Post-Anesthesia Evaluation and Assessment    Patient: Brigida Pollard MRN: [id number]  SSN: [id number]    YOB: 1985  Age: 33 y.o. Sex: female      Data from PACU flowsheet    Cardiovascular Function/Vital Signs  Visit Vitals    /78 (BP 1 Location: Left arm, BP Patient Position: Sitting)    Pulse 77    Temp 36.4 °C (97.5 °F)    Resp 16    Ht 5' 3\" (1.6 m)    Wt 93 kg (205 lb)    SpO2 100%    Breastfeeding Yes    BMI 36.31 kg/m2       Patient is status post epidural anesthesia for * No procedures listed *. Nausea/Vomiting: controlled    Postoperative hydration reviewed and adequate. Pain:  Pain Scale 1: Numeric (0 - 10) (08/16/17 0823)  Pain Intensity 1: 1 (08/16/17 0823)   Managed      Mental Status and Level of Consciousness: Alert and oriented     Pulmonary Status:   O2 Device: Room air (08/16/17 0313)   Adequate oxygenation and airway patent    Complications related to anesthesia: None    Post-anesthesia assessment completed.  No concerns    Signed By: Shen Norris CRNA     August 16, 2017

## 2017-08-16 NOTE — PROGRESS NOTES
Progress Note    Patient: Ashely Osuna MRN: [id number]  SSN: [id number]    YOB: 1985  Age: 33 y.o. Sex: female      Subjective:     Postpartum Day: 1     Delivery: vaginal delivery    Pt denies complaints. Pain controlled. Lochia minimal.  Tolerating diet. Breast and bottle feeding. No CP/SOB/dizziness. Requesting early discharge. Objective:      Patient Vitals for the past 8 hrs:   BP Temp Pulse Resp SpO2   08/16/17 0823 113/78 97.5 °F (36.4 °C) 77 16 100 %   08/16/17 0313 96/58 98.2 °F (36.8 °C) 78 16 99 %       GEN: NAD   Uterine Fundus:   firm at the U. Nontender. Perineum:  intact. Minimal lochia. Ext:  neg edema. neg kaz's sign. Lab/Data Review:  Recent Results (from the past 12 hour(s))   HEMOGLOBIN    Collection Time: 08/16/17  5:10 AM   Result Value Ref Range    HGB 12.6 12.0 - 16.0 g/dL   HEMATOCRIT    Collection Time: 08/16/17  5:10 AM   Result Value Ref Range    HCT 38.1 35.0 - 45.0 %         Assessment:     Doing well postpartum vaginal delivery     Plan:   Advised need for longer postpartum observation due to GBS status. Plan for DC tomorrow. Postpartum care discussed including diet, ambulation, and actvitiy restrictions. Discharge instructions and questions answered for vaginal delivery.     Signed By: Roxanna Tyler DO     August 16, 2017

## 2017-08-16 NOTE — ROUTINE PROCESS
Mom requested motrin before she left so reassessment is unavailable. Discharge instructions reviewed with patient, understanding verbalized of all instructions given. Time given for questions, all questions answered. Electronic signature obtained. Mom walked to front entrance, refused wheelchair, discharged in stable condition.

## 2017-08-16 NOTE — DISCHARGE SUMMARY
Obstetrical Discharge Summary       [address] [facility]  [address] [address]              Patient ID:  Carleen Frank  [id number]  29 y.o.  1985    Admit Date: 8/15/2017    Discharge Date: 2017     Admitting Physician: Olivier Willson MD     Admission Diagnoses: maternity;Labor and delivery, indication for care    Discharge Diagnoses: same as above      Additional Diagnoses: none        Hospital Course: Unremarkable                           Information for the patient's :  Lowell Martin [[id number]]   One Minute Apgar: 8 (Filed from Delivery Summary)  Five  Minute Apgar: 9 (Filed from Delivery Summary)      Immunizations:    Immunization History   Administered Date(s) Administered    Tdap 2017       Group Beta Strep:   GrBStrep, External   Date Value Ref Range Status   2017 positive  Final        Visit Vitals    /78 (BP 1 Location: Left arm, BP Patient Position: Sitting)    Pulse 77    Temp 97.5 °F (36.4 °C)    Resp 16    Ht 5' 3\" (1.6 m)    Wt 205 lb (93 kg)    LMP 2016 (Exact Date)    SpO2 100%    Breastfeeding Yes    BMI 36.31 kg/m2       Vital signs stable, afebrile. Exam:  Patient without distress. Abdomen soft, fundus firm at level of umbilicus, non tender               Perineum with normal lochia noted. Lower extremities are negative for swelling, cords or tenderness. Patient Instructions:   Current Discharge Medication List      START taking these medications    Details   ibuprofen (MOTRIN) 800 mg tablet Take 1 Tab by mouth every eight (8) hours as needed. Qty: 30 Tab, Refills: 0         CONTINUE these medications which have NOT CHANGED    Details   pnv w/o calcium-iron fum-fa 27-1 mg tab Take  by mouth. See discharge instructions provided by nursing. Follow-up in 6 weeks.     Signed:  Wendy Saldana DO  2017  1:41 PM

## 2017-08-16 NOTE — LACTATION NOTE
This note was copied from a baby's chart. Mother was unable to get her other babies to latch with or without a shield, so she pumped and gave pumped breast milk and formula. Mother states she has inverted nipples. She brought her own shield with her and states this baby will not latch with or without the shield so she has supplemented. Suggested having Dad dribble formula on the shield to encourage sucking so the baby can get colostrum. Experienced mother. General discussion. Gave BF information and daily log. Offered assistance if needed.

## 2017-08-16 NOTE — ROUTINE PROCESS
Bedside shift change report given to  leandra Yu rn (oncoming nurse) by joann arellano rn(offgoing nurse). Report included the following information Kardex, Procedure Summary, Intake/Output, MAR and Recent Results.

## 2017-09-26 ENCOUNTER — ROUTINE PRENATAL (OUTPATIENT)
Dept: OBGYN CLINIC | Age: 32
End: 2017-09-26

## 2017-09-26 VITALS
BODY MASS INDEX: 33.49 KG/M2 | HEART RATE: 97 BPM | DIASTOLIC BLOOD PRESSURE: 54 MMHG | SYSTOLIC BLOOD PRESSURE: 91 MMHG | WEIGHT: 189 LBS | HEIGHT: 63 IN

## 2017-09-26 DIAGNOSIS — Z30.09 FAMILY PLANNING: Primary | ICD-10-CM

## 2017-09-26 RX ORDER — ACETAMINOPHEN AND CODEINE PHOSPHATE 120; 12 MG/5ML; MG/5ML
1 SOLUTION ORAL DAILY
Qty: 1 PACKAGE | Refills: 6 | Status: SHIPPED | OUTPATIENT
Start: 2017-09-26

## 2017-09-26 NOTE — PATIENT INSTRUCTIONS
Norethindrone (Aygestin, Regina, Deblitane, Stephanie) - (By mouth)   Why this medicine is used:   Prevents pregnancy. Also treats menstrual problems and endometriosis. Contact a nurse or doctor right away if you have:  · Chest pain, trouble breathing, coughing up blood  · Yellow skin or eyes  · Numbness or weakness, problems with vision, speech, or walking  · Headache, sensitivity to light or sound, nausea, vomiting, stomach pain  · Trouble seeing, double vision, or other eye problems     Common side effects:  · Breast tenderness or swelling, light spotting or bleeding between periods  © 2017 [address] is for End User's use only and may not be sold, redistributed or otherwise used for commercial purposes.

## 2017-09-26 NOTE — PROGRESS NOTES
Subjective:   Ms. Moshe Orta is a 33 y.o. who is now 6 weeks postpartum. OB History      Para Term  AB Living    3 3 3   3    SAB TAB Ectopic Molar Multiple Live Births        0 3        Method of delivery: normal spontaneous vaginal delivery  She is breast-feeding and is experiencing problems. Pregnancy complications: none. She is feeling well and that she has no suicidal/homicidal inclination. She currently uses abstinence for contraception. She plans to use oral progesterone-only contraceptive, IUD for contraception. 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 30 Tab 0    pnv w/o calcium-iron fum-fa 27-1 mg tab Take  by mouth. No Known Allergies  Past Medical History:   Diagnosis Date    11 weeks gestation of pregnancy 2017    Mild intermittent asthma with acute exacerbation 2017    Nausea and vomiting in pregnancy prior to 22 weeks gestation 2017    Sciatica of left side 2017     No past surgical history on file. No family history on file.      Objective:   Physical Exam:  Date of last Pap smear:   Physical Exam: GENERAL APPEARANCE: alert, well appearing, in no apparent distress  EXTERNAL GENITALIA POSTPARTUM: normal, well-healed, without lesions or masses  VAGINA POSTPARTUM: normal, well-healed, physiologic discharge, without lesions  CERVIX POSTPARTUM: normal, well-healed, without lesions  UTERUS POSTPARTUM: normal size, well involuted, firm, non-tender  ADNEXA POSTPARTUM: no masses palpable and nontender    Assessment/Plan:   normal postpartum exam  patient is a candidate for oral progesterone-only contraceptive, IUD for contraception, with no contraindications  able to resume normal activities  See orders and Patient Instructions  Resume all normal activities     ICD-10-CM ICD-9-CM    1.  Family planning Z30.09 V25.09 AMB POC URINE PREGNANCY TEST, VISUAL COLOR COMPARISON

## 2017-10-17 ENCOUNTER — OFFICE VISIT (OUTPATIENT)
Dept: OBGYN CLINIC | Age: 32
End: 2017-10-17

## 2017-10-17 VITALS
HEIGHT: 63 IN | DIASTOLIC BLOOD PRESSURE: 77 MMHG | BODY MASS INDEX: 33.49 KG/M2 | HEART RATE: 89 BPM | SYSTOLIC BLOOD PRESSURE: 115 MMHG | WEIGHT: 189 LBS

## 2017-10-17 DIAGNOSIS — Z30.09 FAMILY PLANNING: Primary | ICD-10-CM

## 2017-10-17 NOTE — PROCEDURES
Mirena IUD Insertion:    Michelle Han is a 33 y.o. OTHER here for Mirena insertion for a long term contraception. She is currently not on her menstrual cycle. Questions/concerns answered. Consent signed. Chart reviewed for the following:     I reviewed patients Ob/Gyn/medical/surgical/meds and allergy history. Her most recent cultures were negative. Today's urine HCG:  Negative. TIME OUT performed immediately prior to start of procedure:   I, Jennifer Mercedes MD, have performed the following reviews on Michelle Han prior to the start of the procedure:            * Patient was identified by name and date of birth   * Agreement on procedure being performed was verified  * Risks and Benefits explained to the patient  * Procedure site verified and marked as necessary  * Patient was positioned for comfort  * Consent was signed and verified     Time: 1450      Date of procedure: 10/17/2017    Procedure performed by: Jennifer Mercedes MD    Provider assisted by: Prudence Adan MA    Patient assisted by: self    How tolerated by patient: tolerated the procedure well with no complications    Comments: none      Procedure:    After informed consent, the patient was placed in dorsal lithotomy position. A lighted speculum was placed. Blood and mucus was wiped with scopette. Betadine swabs were used to wipe the cervix twice. A single tooth tinaculum was used to grasp the anterior lip of the cervix. The fundus sounded to 8 cm. The Mirena was inserted as per the 's instruction. The string was trimmed approximately 2-3 cm. The speculum was removed. The patient tolerated the procedure well. Disposition:  IUD check in 4 weeks. Plan of care discussed. Patient expressed understanding.

## 2017-10-17 NOTE — PROGRESS NOTES
34 y/o 2 months postpartum presents to the office for IUD insertion. SHe has no desire for conception in the near future. She is breastfeeding, but having normal cycles. She denies any concerns today. Last pap: 2/18/17- normal    See full procedure note for IUD insertion. A/p;  IUD insertion- no immediate complication. Discussed R/B/A and informed consent obtained. RTO 4 weeks for IUD check.

## 2017-11-14 ENCOUNTER — OFFICE VISIT (OUTPATIENT)
Dept: OBGYN CLINIC | Age: 32
End: 2017-11-14

## 2017-11-14 VITALS
DIASTOLIC BLOOD PRESSURE: 75 MMHG | HEART RATE: 91 BPM | HEIGHT: 63 IN | SYSTOLIC BLOOD PRESSURE: 115 MMHG | WEIGHT: 190 LBS | BODY MASS INDEX: 33.66 KG/M2

## 2017-11-14 DIAGNOSIS — Z30.09 FAMILY PLANNING: Primary | ICD-10-CM

## 2017-11-14 NOTE — PROGRESS NOTES
IUD check-up. Pt. Has intermittent vaginal bleeding. Feels like her cycle is coming now. Denies pain or other concern. SVE: IUD strings not clearly seen. Cervix appears normal.  BME; mobile, NT uterus. A/P:  Normal IUD check-up. RTO prn.

## 2017-11-14 NOTE — PATIENT INSTRUCTIONS
Learning About Birth Control: Intrauterine Device (IUD)  What is an intrauterine device (IUD)? The intrauterine device (IUD) is used to prevent pregnancy. It's a small, plastic, T-shaped device. Your doctor places the IUD in your uterus. You have a choice between a hormonal IUD and a copper IUD. The hormonal IUD can prevent pregnancy for 3 to 5 years, depending on which IUD is used. Once you have it, you don't have to do anything else to prevent pregnancy. The copper IUD can prevent pregnancy for 10 years. Once you have it, you don't have to do anything else to prevent pregnancy. A string tied to the end of the IUD hangs down through the opening of the uterus (called the cervix) into the vagina. You can check that the IUD is in place by feeling for the string. The IUD usually stays in the uterus until your doctor removes it. How well does it work? In the first year of use:  · When the hormonal IUD is used exactly as directed, fewer than 1 woman out of 100 has an unplanned pregnancy. · When the copper IUD is used exactly as directed, fewer than 1 woman out of 100 has an unplanned pregnancy. Be sure to tell your doctor about any health problems you have or medicines you take. He or she can help you choose the birth control method that is right for you. What are the advantages of an IUD? · An IUD is one of the most effective methods of birth control. · It prevents pregnancy for 3 to 10 years, depending on the type. You don't have to worry about birth control during this time. · It's safe to use while breastfeeding. · IUDs don't contain estrogen. So you can use an IUD if you don't want to take estrogen or can't take estrogen because you have certain health problems or concerns. · An IUD is convenient. It is always providing birth control. You don't need to remember to take a pill or get a shot. You don't have to interrupt sex to protect against pregnancy.   · A hormonal IUD may reduce heavy bleeding and cramping. What are the disadvantages of an IUD? · An IUD doesn't protect against sexually transmitted infections (STIs), such as herpes or HIV/AIDS. If you aren't sure if your sex partner might have an STI, use a condom to protect against disease. · A copper IUD may cause periods with more bleeding and cramping. · You have to see a doctor to have an IUD inserted and removed. · You have to check to see if the string is in place. Where can you learn more? Go to http://mo-carlota.info/. Enter V364 in the search box to learn more about \"Learning About Birth Control: Intrauterine Device (IUD). \"  Current as of: March 16, 2017  Content Version: 11.4  © 0935-8891 [employer]. Care instructions adapted under license by [employer] (which disclaims liability or warranty for this information). If you have questions about a medical condition or this instruction, always ask your healthcare professional. [address] any warranty or liability for your use of this information.